# Patient Record
Sex: FEMALE | Race: WHITE | NOT HISPANIC OR LATINO | Employment: OTHER | URBAN - METROPOLITAN AREA
[De-identification: names, ages, dates, MRNs, and addresses within clinical notes are randomized per-mention and may not be internally consistent; named-entity substitution may affect disease eponyms.]

---

## 2017-09-21 DIAGNOSIS — I35.0 AORTIC VALVE STENOSIS, UNSPECIFIED ETIOLOGY: Primary | ICD-10-CM

## 2017-10-04 ENCOUNTER — HOSPITAL ENCOUNTER (OUTPATIENT)
Dept: PULMONOLOGY | Facility: CLINIC | Age: 82
Discharge: HOME | End: 2017-10-04
Payer: MEDICARE

## 2017-10-04 ENCOUNTER — OFFICE VISIT (OUTPATIENT)
Dept: CARDIOLOGY | Facility: CLINIC | Age: 82
End: 2017-10-04
Payer: MEDICARE

## 2017-10-04 ENCOUNTER — HOSPITAL ENCOUNTER (OUTPATIENT)
Dept: CARDIOLOGY | Facility: CLINIC | Age: 82
Discharge: HOME | End: 2017-10-04
Payer: MEDICARE

## 2017-10-04 ENCOUNTER — HOSPITAL ENCOUNTER (OUTPATIENT)
Dept: RADIOLOGY | Facility: HOSPITAL | Age: 82
Discharge: HOME | End: 2017-10-04
Attending: INTERNAL MEDICINE
Payer: MEDICARE

## 2017-10-04 VITALS
WEIGHT: 175.5 LBS | HEART RATE: 60 BPM | SYSTOLIC BLOOD PRESSURE: 106 MMHG | HEIGHT: 61 IN | BODY MASS INDEX: 33.14 KG/M2 | DIASTOLIC BLOOD PRESSURE: 58 MMHG | OXYGEN SATURATION: 97 %

## 2017-10-04 VITALS — BODY MASS INDEX: 34.16 KG/M2 | WEIGHT: 174 LBS | HEIGHT: 60 IN

## 2017-10-04 DIAGNOSIS — I35.0 AORTIC VALVE STENOSIS, UNSPECIFIED ETIOLOGY: ICD-10-CM

## 2017-10-04 DIAGNOSIS — I10 ESSENTIAL HYPERTENSION: ICD-10-CM

## 2017-10-04 DIAGNOSIS — I48.0 PAROXYSMAL ATRIAL FIBRILLATION: ICD-10-CM

## 2017-10-04 DIAGNOSIS — Q27.30 AVM (ARTERIOVENOUS MALFORMATION): ICD-10-CM

## 2017-10-04 DIAGNOSIS — K92.2 LOWER GI BLEED: ICD-10-CM

## 2017-10-04 DIAGNOSIS — I35.0 NODULAR CALCIFIC AORTIC VALVE STENOSIS: Primary | ICD-10-CM

## 2017-10-04 DIAGNOSIS — E03.9 HYPOTHYROIDISM, UNSPECIFIED TYPE: ICD-10-CM

## 2017-10-04 DIAGNOSIS — I35.0 AORTIC VALVE STENOSIS, ETIOLOGY OF CARDIAC VALVE DISEASE UNSPECIFIED: Primary | ICD-10-CM

## 2017-10-04 DIAGNOSIS — I35.0 NONRHEUMATIC AORTIC VALVE STENOSIS: Primary | ICD-10-CM

## 2017-10-04 DIAGNOSIS — I49.5 SSS (SICK SINUS SYNDROME): ICD-10-CM

## 2017-10-04 LAB
AORTIC VALVE REGURGITATION: ABNORMAL
AORTIC VALVE STENOSIS: ABNORMAL
DIASTOLIC DYSFUNCTION: YES
ESTIMATED PA SYSTOLIC PRESSURE: 39.72
MITRAL VALVE MOBILITY: NORMAL
MITRAL VALVE REGURGITATION: ABNORMAL
PRE FEV1 FVC: 79
PRE FEV1: 1.6
PRE FVC: 2.03
PREDICTED FEV1 FVC: 76
PREDICTED FEV1: 1.6
PREDICTED FVC: 2.2
RETIRED EF AND QEF - SEE NOTES: 68 (ref 55–65)
TRICUSPID VALVE REGURGITATION: ABNORMAL

## 2017-10-04 PROCEDURE — 93306 TTE W/DOPPLER COMPLETE: CPT | Mod: PBBFAC | Performed by: INTERNAL MEDICINE

## 2017-10-04 PROCEDURE — 74174 CTA ABD&PLVS W/CONTRAST: CPT | Mod: TC

## 2017-10-04 PROCEDURE — 71275 CT ANGIOGRAPHY CHEST: CPT | Mod: 26,GC,, | Performed by: RADIOLOGY

## 2017-10-04 PROCEDURE — 94729 DIFFUSING CAPACITY: CPT | Mod: PBBFAC | Performed by: INTERNAL MEDICINE

## 2017-10-04 PROCEDURE — 94729 DIFFUSING CAPACITY: CPT | Mod: 26,S$PBB,, | Performed by: INTERNAL MEDICINE

## 2017-10-04 PROCEDURE — 99211 OFF/OP EST MAY X REQ PHY/QHP: CPT | Mod: PBBFAC,25,27

## 2017-10-04 PROCEDURE — 94010 BREATHING CAPACITY TEST: CPT | Mod: PBBFAC | Performed by: INTERNAL MEDICINE

## 2017-10-04 PROCEDURE — 36600 WITHDRAWAL OF ARTERIAL BLOOD: CPT | Mod: PBBFAC | Performed by: INTERNAL MEDICINE

## 2017-10-04 PROCEDURE — 94620 PR PULMONARY STRESS TESTING,SIMPLE: CPT | Mod: 26,S$PBB,, | Performed by: INTERNAL MEDICINE

## 2017-10-04 PROCEDURE — 94010 BREATHING CAPACITY TEST: CPT | Mod: 26,S$PBB,, | Performed by: INTERNAL MEDICINE

## 2017-10-04 PROCEDURE — 82803 BLOOD GASES ANY COMBINATION: CPT | Mod: PBBFAC | Performed by: INTERNAL MEDICINE

## 2017-10-04 PROCEDURE — 36600 WITHDRAWAL OF ARTERIAL BLOOD: CPT | Mod: S$PBB,,, | Performed by: INTERNAL MEDICINE

## 2017-10-04 PROCEDURE — 99999 PR PBB SHADOW E&M-EST. PATIENT-LVL I: CPT | Mod: PBBFAC,,,

## 2017-10-04 PROCEDURE — 74174 CTA ABD&PLVS W/CONTRAST: CPT | Mod: 26,GC,, | Performed by: RADIOLOGY

## 2017-10-04 PROCEDURE — 99214 OFFICE O/P EST MOD 30 MIN: CPT | Mod: S$PBB,,, | Performed by: INTERNAL MEDICINE

## 2017-10-04 PROCEDURE — 99215 OFFICE O/P EST HI 40 MIN: CPT | Mod: PBBFAC,25

## 2017-10-04 PROCEDURE — 99205 OFFICE O/P NEW HI 60 MIN: CPT | Mod: S$PBB,,, | Performed by: THORACIC SURGERY (CARDIOTHORACIC VASCULAR SURGERY)

## 2017-10-04 PROCEDURE — 94620 PR PULMONARY STRESS TESTING,SIMPLE: CPT | Mod: PBBFAC | Performed by: INTERNAL MEDICINE

## 2017-10-04 PROCEDURE — 99999 PR PBB SHADOW E&M-EST. PATIENT-LVL V: CPT | Mod: PBBFAC,,,

## 2017-10-04 PROCEDURE — 25500020 PHARM REV CODE 255: Performed by: INTERNAL MEDICINE

## 2017-10-04 RX ORDER — KETOCONAZOLE 20 MG/ML
SHAMPOO, SUSPENSION TOPICAL
Refills: 4 | COMMUNITY
Start: 2017-09-07 | End: 2019-03-23

## 2017-10-04 RX ORDER — DIPHENHYDRAMINE HCL 25 MG
50 CAPSULE ORAL ONCE
Status: CANCELLED | OUTPATIENT
Start: 2017-10-04 | End: 2017-10-04

## 2017-10-04 RX ORDER — ACETAMINOPHEN 325 MG/1
325 TABLET ORAL EVERY 6 HOURS PRN
COMMUNITY
End: 2019-03-23

## 2017-10-04 RX ORDER — DEXTROSE MONOHYDRATE AND SODIUM CHLORIDE 5; .45 G/100ML; G/100ML
INJECTION, SOLUTION INTRAVENOUS CONTINUOUS
Status: CANCELLED | OUTPATIENT
Start: 2017-10-04

## 2017-10-04 RX ADMIN — IOHEXOL 100 ML: 350 INJECTION, SOLUTION INTRAVENOUS at 10:10

## 2017-10-04 NOTE — PROGRESS NOTES
"INTERVENTIONAL CARDIOLOGY  VALVE CENTER    Referring Doctor: Dr. Heller    Reason for Consult: Severe Aortic Stenosis    HISTORY OF PRESENT ILLNESS:    Sangeetha Morales is a 89 y.o. female referred by Dr. Heller for evaluation of severe AS (NYHA Class III sx). She has a PMH of HTN, HLD, AF and PPM 1/2016. The patient was told a few months ago about her valve. The patient was on Eliquis (stopped 2/2 GIB in 2016). She reports for the GIB, they "cauterized my stomach" and is s/p colectomy. The patient reports SOB with talking worsening since Jan 2017. She denies PND and orthopnea. She reports LE swelling since 1/2017 and denies LOC and CP. The patient denies bloody stools currently. She uses a walker and gets SOB when using a walker [id number]. The patient cooks for herself. She has not been surgery yet.   she has undergone the following TAVR work-up:   ECHO (Date 10/4/17): JAZZMINE= 0.67 cm2, MG= 58 mmHg, [address]= 5.0 m/s, EF= 65-70%.   LHC (Date 9/6/17): LM calcified (normal), LAD non-obstructive (mLAD 25% just before large diagonal branch), pLCx 25% stenosis, RCA non-obstructive (right dominant)  STS: 5.7%   Frailty: 2/4   Iliacs are >7.94 mm on L and > 8.02 mm on R   LVOT area by CTA is 4.97 cm2 and Avg Diameter is 25.2 mm (distance 28.6 mm x 21.3 mm) per Dr Cleary  Other CT findings: 1.0 cm sub-solid pulmonary nodule in the left upper lobe. Finding is nonspecific and could represent infection, inflammation, with neoplasm a possibility.  Recommend followup in 6 months (4/2018). Will defer to PCP. Indeterminate cystic structure the right adnexa.  Recommend dedicated pelvic ultrasound for further assessment.  Full CODE STATUS  EKG findings: 12/2016: 1st degree HB; bifascicular block  PFTs: Mild: FEV1 100% predicted, FVC 94% predicted, MVV 82% predicted, DLCO 124% predicted  6MWT: 636 ft  High risk: age & frailty per Dr. Head on 10/4/17    She is a 29 mm EvolutR candidate via R TF access.      Past Medical History: "   Diagnosis Date    Afib     Anemia 10/2016    Anticoagulant long-term use     eliquis    Arthritis     Cardiomegaly     Colon polyps 10/2016    Hyperlipidemia     Hypertension        Past Surgical History:   Procedure Laterality Date    CARDIOVERSION  12/2014    CARPAL TUNNEL RELEASE Bilateral     CATARACT EXTRACTION Bilateral     HYSTERECTOMY      bso    JOINT REPLACEMENT      RIGHT COLECTOMY  10/06/2016    TOTAL KNEE ARTHROPLASTY Right        Review of Systems   Constitutional: Negative for chills and fever.   HENT: Negative for sore throat.    Eyes: Negative for photophobia and pain.   Respiratory: Positive for shortness of breath. Negative for cough and stridor.    Cardiovascular: Positive for leg swelling. Negative for chest pain, orthopnea and PND.   Gastrointestinal: Negative for diarrhea and vomiting.   Genitourinary: Negative for dysuria and hematuria.   Musculoskeletal: Negative for falls and myalgias.   Skin: Negative for itching and rash.   Neurological: Negative for focal weakness and loss of consciousness.   Psychiatric/Behavioral: The patient is nervous/anxious. The patient does not have insomnia.        LMP  (LMP Unknown)     Physical Exam   Constitutional: She is oriented to person, place, and time and well-developed, well-nourished, and in no distress.   HENT:   Head: Normocephalic and atraumatic.   Eyes: EOM are normal. Pupils are equal, round, and reactive to light.   Neck: Normal range of motion. Neck supple.   Cardiovascular: Normal rate and regular rhythm.  Exam reveals no gallop and no friction rub.    Murmur heard.  3/6 systolic murmur heard loudest in the second intercostal space right; also hear second intercostal space left   Pulmonary/Chest: Effort normal. She has no wheezes. She has no rales.   Abdominal: Soft. She exhibits no distension. There is no tenderness. There is no rebound and no guarding.   Musculoskeletal: She exhibits edema.   1+ edema   Neurological: She is  alert and oriented to person, place, and time.   Skin: Skin is warm.   Psychiatric: Affect normal.       CMP  Sodium   Date Value Ref Range Status   10/04/2017 142 136 - 145 mmol/L Final     Potassium   Date Value Ref Range Status   10/04/2017 3.8 3.5 - 5.1 mmol/L Final     Chloride   Date Value Ref Range Status   10/04/2017 105 95 - 110 mmol/L Final     CO2   Date Value Ref Range Status   10/04/2017 27 23 - 29 mmol/L Final     Glucose   Date Value Ref Range Status   10/04/2017 103 70 - 110 mg/dL Final     BUN, Bld   Date Value Ref Range Status   10/04/2017 15 8 - 23 mg/dL Final     Creatinine   Date Value Ref Range Status   10/04/2017 0.9 0.5 - 1.4 mg/dL Final     Calcium   Date Value Ref Range Status   10/04/2017 9.5 8.7 - 10.5 mg/dL Final     Total Protein   Date Value Ref Range Status   11/26/2016 7.0 6.0 - 8.4 g/dL Final     Albumin   Date Value Ref Range Status   10/04/2017 3.8 3.5 - 5.2 g/dL Final     Total Bilirubin   Date Value Ref Range Status   11/26/2016 0.7 0.2 - 1.3 mg/dL Final     Comment:     For infants and newborns, interpretation of results should be based  on gestational age, weight and in agreement with clinical  observations.  Premature Infant recommended reference ranges:  Up to 24 hours.............<8.0 mg/dL  Up to 48 hours............<12.0 mg/dL  3-5 days..................<15.0 mg/dL  6-29 days.................<15.0 mg/dL       Alkaline Phosphatase   Date Value Ref Range Status   11/26/2016 55 38 - 145 U/L Final     AST   Date Value Ref Range Status   11/26/2016 32 14 - 36 U/L Final     ALT   Date Value Ref Range Status   11/26/2016 28 10 - 44 U/L Final     Anion Gap   Date Value Ref Range Status   10/04/2017 10 8 - 16 mmol/L Final     eGFR if    Date Value Ref Range Status   10/04/2017 >60.0 >60 mL/min/1.73 m^2 Final     eGFR if non    Date Value Ref Range Status   10/04/2017 56.9 (A) >60 mL/min/1.73 m^2 Final     Comment:     Calculation used to obtain the  estimated glomerular filtration  rate (eGFR) is the CKD-EPI equation. Since race is unknown   in our information system, the eGFR values for   -American and Non--American patients are given   for each creatinine result.       Lab Results   Component Value Date    WBC 8.68 10/04/2017    HGB 14.9 10/04/2017    HCT 44.9 10/04/2017    MCV 93 10/04/2017     10/04/2017     Lab Results   Component Value Date    APTT 27.0 11/30/2016     Lab Results   Component Value Date    INR 1.0 11/30/2016    INR 1.2 11/26/2016    INR 1.4 10/20/2016     CT 10/4/2017  1.0 cm sub-solid pulmonary nodule in the left upper lobe. Finding is nonspecific and could represent infection, inflammation, with neoplasm a possibility.  Recommend followup in 6 months (4/2018).    Indeterminate cystic structure the right adnexa.  Recommend dedicated pelvic ultrasound for further assessment.    TAVR measurements as detailed above.    Moderate aortic valve calcification.    Small hiatal hernia.    Postsurgical changes of right hemicolectomy with ileocolic anastomosis.    This report has been flagged in the [facility] medical record.    ASSESSMENT:    Patient Active Problem List   Diagnosis    Aortic stenosis: she has undergone the following TAVR work-up:   ECHO (Date 10/4/17): JAZZMINE= 0.67 cm2, MG= 58 mmHg, [address]= 5.0 m/s, EF= 65-70%.   LHC (Date 9/6/17): LM calcified (normal), LAD non-obstructive (mLAD 25% just before large diagonal branch), pLCx 25% stenosis, RCA non-obstructive (right dominant)  STS: 5.7%   Frailty: 2/4   Iliacs are >7.94 mm on L and > 8.02 mm on R   LVOT area by CTA is 4.97 cm2 and Avg Diameter is 25.2 mm (distance 28.6 mm x 21.3 mm) per Dr Cleary  Other CT findings: 1.0 cm sub-solid pulmonary nodule in the left upper lobe. Finding is nonspecific and could represent infection, inflammation, with neoplasm a possibility.  Recommend followup in 6 months (4/2018). Will defer to PCP. Indeterminate cystic structure the right  adnexa.  Recommend dedicated pelvic ultrasound for further assessment.  Full CODE STATUS  EKG findings: 12/2016: 1st degree HB; bifascicular block  PFTs: Mild: FEV1 100% predicted, FVC 94% predicted, MVV 82% predicted, DLCO 124% predicted  6MWT: 636 ft  High risk: age & frailty per Dr. Head on 10/4/17    He is a 29 mm EvolutR candidate via R TF access.    Colon polyps    Paroxysmal atrial fibrillation: on ASA w/ h/o GIB on Eliquis; on amiodarone    Lower GI bleed: no longer on Eliquis; s/p partial colectomy    Symptomatic anemia: 14.9/44.9    Essential hypertension: controlled on amlodipine, enalapril            SSS (sick sinus syndrome): s/p PPM    AVM (arteriovenous malformation)    Hypothyroidism: on Synthroid     PLAN:    The patient is candidate for 29 mm Evolute via R TF access for Nov 28, 2017. We will have PCP perform pelvic US and f/u CT in 6 months based on incidental CT findings (see above). The patient will be on ASA alone after PERRY based on GIB. Will bring back to TAVR clinic to sign consents.     Will forward final CT report to Dr Jeromy James to schedule further ultrasound given incidental finding of adnexal cyst.      Everardo Cleary MD  Interventional Cardiology  Structural/Valvular heart disease  [phone]

## 2017-10-05 NOTE — PROCEDURES
Sangeetha Morales is a 89 y.o.  female patient, who presents for a 6 minute walk test ordered by Carroll Morales MD.  The diagnosis is Aortic Valve Disorder.  The patient's BMI is 34.1 kg/m2.  Predicted distance (lower limit of normal) is 146.35 meters.      Test Results:    The test was completed without stopping.  The total time walked was 360 seconds.  During walking, the patient reported:  Dyspnea, Lightheadedness.  The patient used a walker for assistance during testing.     10/04/2017---------Distance: 193.85 meters (636 feet)     O2 Sat % Supplemental Oxygen Heart Rate Blood Pressure Kimberlee Scale   Pre-exercise  (Resting) 97 % Room Air 60 bpm 163/78 mmHg 1   During Exercise 96 % Room Air 110 bpm 185/88 mmHg 2   Post-exercise  (Recovery) 97 % Room Air  74 bpm       Recovery Time:  88 seconds    Performing nurse/tech:  ERICA Lieberman      PREVIOUS STUDY:   The patient has not had a previous study.      CLINICAL INTERPRETATION:  Six minute walk distance is 193.85 meters (636 feet) with light dyspnea.  During exercise, there was no significant desaturation while breathing room air.  Both blood pressure and heart rate increased significantly with walking.  Hypertension was present prior to exercise.  The patient reported non-pulmonary symptoms during exercise.  Significant exercise impairment is likely due to cardiovascular causes and subjective symptoms.  No previous study performed.  Based upon age and body mass index, exercise capacity may be normal.

## 2017-10-06 NOTE — PROGRESS NOTES
"History & Physical    SUBJECTIVE:     History of Present Illness:  Patient is a 89 y.o. female referred by Dr. Heller,  for evaluation of severe aortic stenosis (NYHA Class III sx). She has a past history of HTN, HLD, AF and PPM 1/2016.  She was on Eliquis (stopped 2/2 GIB in 2016). She reports for the GIB, they "cauterized my stomach" and is s/p colectomy. The patient reports SOB that has been progressive since January. She denies PND and orthopnea. She reports LE swelling since 1/2017 and denies LOC and CP. The patient denies bloody stools currently. She uses a walker and gets SOB when using a walker [id number].     she has undergone the following TAVR work-up:   · ECHO (Date 10/4/17): JAZZMINE= 0.67 cm2, MG= 58 mmHg, [address]= 5.0 m/s, EF= 65-70%.   · LHC (Date 9/6/17): LM calcified (normal), LAD non-obstructive (mLAD 25% just before large diagonal branch), pLCx 25% stenosis, RCA non-obstructive (right dominant)  · STS: 5.7%   · Frailty: 2/4   · Iliacs are >7.94 mm on L and > 8.02 mm on R   · LVOT area by CTA is 4.97 cm2 and Avg Diameter is 25.2 mm (distance 28.6 mm x 21.3 mm) per Dr Cleary  · Other CT findings: 1.0 cm sub-solid pulmonary nodule in the left upper lobe. Finding is nonspecific and could represent infection, inflammation, with neoplasm a possibility.  Recommend followup in 6 months (4/2018). Will defer to PCP. Indeterminate cystic structure the right adnexa.  Recommend dedicated pelvic ultrasound for further assessment.  · Full CODE STATUS  · EKG findings: 12/2016: 1st degree HB; bifascicular block  · PFTs: Mild: FEV1 100% predicted, FVC 94% predicted, MVV 82% predicted, DLCO 124% predicted  · 6MWT: 636 ft    No chief complaint on file.      Review of patient's allergies indicates:  No Known Allergies    Current Outpatient Prescriptions   Medication Sig Dispense Refill    acetaminophen (TYLENOL) 325 MG tablet Take 325 mg by mouth every 6 (six) hours as needed for Pain.      amiodarone (PACERONE) 200 " MG Tab Take 1 mg by mouth every evening.       amlodipine (NORVASC) 10 MG tablet Take 10 mg by mouth once daily.      aspirin (ECOTRIN) 81 MG EC tablet Take 1 tablet (81 mg total) by mouth once daily.  0    atorvastatin (LIPITOR) 20 MG tablet Take 20 mg by mouth every evening.       enalapril (VASOTEC) 20 MG tablet Take 20 mg by mouth every evening.       ferrous gluconate (FERGON) 324 MG tablet Take 1 tablet (324 mg total) by mouth 3 (three) times daily with meals.      fluoxetine (PROZAC) 20 MG capsule Take 20 mg by mouth every evening.   3    folic acid (FOLVITE) 1 MG tablet Take 1 mg by mouth once daily.      furosemide (LASIX) 20 MG tablet Take 20 mg by mouth twice a week. Wed and sat      gabapentin (NEURONTIN) 400 MG capsule Take 400 mg by mouth 2 (two) times daily.      ketoconazole (NIZORAL) 2 % shampoo APPLY to scalp 2-3 times per week  4    levothyroxine (SYNTHROID) 25 MCG tablet Take 1 tablet (25 mcg total) by mouth before breakfast. 30 tablet 0    pantoprazole (PROTONIX) 40 MG tablet Take 1 tablet (40 mg total) by mouth once daily. 30 tablet 0    polyethylene glycol (GLYCOLAX) 17 gram PwPk Take 17 g by mouth once daily.  0    sucralfate (CARAFATE) 1 gram tablet 4 (four) times daily before meals and nightly.   6     No current facility-administered medications for this visit.        Past Medical History:   Diagnosis Date    Afib     Anemia 10/2016    Anticoagulant long-term use     eliquis    Arthritis     Cardiomegaly     Colon polyps 10/2016    Hyperlipidemia     Hypertension      Past Surgical History:   Procedure Laterality Date    CARDIOVERSION  12/2014    CARPAL TUNNEL RELEASE Bilateral     CATARACT EXTRACTION Bilateral     HYSTERECTOMY      bso    JOINT REPLACEMENT      RIGHT COLECTOMY  10/06/2016    TOTAL KNEE ARTHROPLASTY Right      Family History   Problem Relation Age of Onset    Early death Mother      pneumonia    Hypertension Father     Cancer Father       face    Cancer Brother      colon    Heart disease Brother      Social History   Substance Use Topics    Smoking status: Never Smoker    Smokeless tobacco: Never Used    Alcohol use No     Review of Systems   Review of Systems   Constitutional: Negative for fever and malaise/fatigue.   HENT: Negative for congestion and sore throat.    Eyes: Negative for blurred vision, double vision and discharge.   Respiratory: see HPI   Cardiovascular: see HPI  Gastrointestinal: Negative for abdominal pain, constipation, diarrhea, nausea and vomiting.   Genitourinary: Negative for dysuria, frequency and urgency.   Musculoskeletal: Negative for back pain and myalgias.   Skin: Negative for itching and rash.   Neurological: Negative for dizziness, speech change, seizures, weakness and headaches.   Endo/Heme/Allergies: Does not bruise/bleed easily.   Psychiatric/Behavioral: Negative for depression. The patient is not nervous/anxious.        OBJECTIVE:     Vital Signs (Most Recent)  reviewed      Physical Exam     Physical Exam   Constitutional: Patient appears well-developed and well-nourished.   HENT:   Head: Normocephalic and atraumatic.   Eyes: Pupils are equal, round, and reactive to light.   Neck: Normal range of motion. Neck supple.   Cardiovascular: Normal rate, regular rhythm and normal heart sounds.    Pulmonary/Chest: Effort normal and breath sounds normal.   Abdominal: Soft. Bowel sounds are normal.   Musculoskeletal: Normal range of motion.   Neurological: Alert and oriented to person, place, and time.   Skin: Skin is warm and dry.   Psychiatric: Normal mood and affect. Behavior is normal.         ASSESSMENT/PLAN:   89 year old female with severe AS presents for TAVR evaluation.    PLAN:     High risk for SAVR due to age and debility

## 2017-11-22 DIAGNOSIS — R94.5 ABNORMAL RESULTS OF LIVER FUNCTION STUDIES: ICD-10-CM

## 2017-11-22 DIAGNOSIS — I25.10 CORONARY ARTERY DISEASE, ANGINA PRESENCE UNSPECIFIED, UNSPECIFIED VESSEL OR LESION TYPE, UNSPECIFIED WHETHER NATIVE OR TRANSPLANTED HEART: Primary | ICD-10-CM

## 2017-11-22 DIAGNOSIS — D64.9 ANEMIA, UNSPECIFIED TYPE: ICD-10-CM

## 2017-11-22 DIAGNOSIS — R79.1 ABNORMAL COAGULATION PROFILE: ICD-10-CM

## 2017-11-26 ENCOUNTER — DOCUMENTATION ONLY (OUTPATIENT)
Dept: CARDIOTHORACIC SURGERY | Facility: HOSPITAL | Age: 82
End: 2017-11-26

## 2017-11-27 ENCOUNTER — OFFICE VISIT (OUTPATIENT)
Dept: CARDIOLOGY | Facility: CLINIC | Age: 82
DRG: 266 | End: 2017-11-27
Payer: MEDICARE

## 2017-11-27 VITALS
WEIGHT: 173.94 LBS | HEART RATE: 55 BPM | DIASTOLIC BLOOD PRESSURE: 67 MMHG | HEIGHT: 62 IN | SYSTOLIC BLOOD PRESSURE: 128 MMHG | BODY MASS INDEX: 32.01 KG/M2 | OXYGEN SATURATION: 96 %

## 2017-11-27 DIAGNOSIS — I35.0 AORTIC VALVE STENOSIS, ETIOLOGY OF CARDIAC VALVE DISEASE UNSPECIFIED: Primary | ICD-10-CM

## 2017-11-27 DIAGNOSIS — I48.0 PAROXYSMAL ATRIAL FIBRILLATION: ICD-10-CM

## 2017-11-27 DIAGNOSIS — I10 ESSENTIAL HYPERTENSION: ICD-10-CM

## 2017-11-27 DIAGNOSIS — Z95.0 HX OF CARDIAC PACEMAKER: Chronic | ICD-10-CM

## 2017-11-27 DIAGNOSIS — Q27.30 AVM (ARTERIOVENOUS MALFORMATION): ICD-10-CM

## 2017-11-27 DIAGNOSIS — K92.2 LOWER GI BLEED: ICD-10-CM

## 2017-11-27 DIAGNOSIS — E03.9 HYPOTHYROIDISM, UNSPECIFIED TYPE: ICD-10-CM

## 2017-11-27 DIAGNOSIS — I49.5 SSS (SICK SINUS SYNDROME): ICD-10-CM

## 2017-11-27 DIAGNOSIS — I48.91 ATRIAL FIBRILLATION, UNSPECIFIED TYPE: ICD-10-CM

## 2017-11-27 PROCEDURE — 99214 OFFICE O/P EST MOD 30 MIN: CPT | Mod: S$PBB,,, | Performed by: INTERNAL MEDICINE

## 2017-11-27 PROCEDURE — 99213 OFFICE O/P EST LOW 20 MIN: CPT | Mod: PBBFAC

## 2017-11-27 PROCEDURE — 99999 PR PBB SHADOW E&M-EST. PATIENT-LVL III: CPT | Mod: PBBFAC,,,

## 2017-11-27 NOTE — PROGRESS NOTES
"History & Physical     SUBJECTIVE:      History of Present Illness:  Patient is a 89 y.o. female referred by Dr. Heller,  for evaluation of severe aortic stenosis (NYHA Class III sx). She has a past history of HTN, HLD, AF and PPM 1/2016.  She was on Eliquis (stopped 2/2 GIB in 2016). She reports for the GIB, they "cauterized my stomach" and is s/p colectomy. The patient reports SOB that has been progressive since January. She denies PND and orthopnea. She reports LE swelling since 1/2017 and denies LOC and CP. The patient denies bloody stools currently. She uses a walker and gets SOB when using a walker [id number].      she has undergone the following TAVR work-up:   · ECHO (Date 10/4/17): JAZZMINE= 0.67 cm2, MG= 58 mmHg, [address]= 5.0 m/s, EF= 65-70%.   · LHC (Date 9/6/17): LM calcified (normal), LAD non-obstructive (mLAD 25% just before large diagonal branch), pLCx 25% stenosis, RCA non-obstructive (right dominant)  · STS: 5.7%   · Frailty: 2/4   · Iliacs are >7.94 mm on L and > 8.02 mm on R   · LVOT area by CTA is 4.97 cm2 and Avg Diameter is 25.2 mm (distance 28.6 mm x 21.3 mm) per Dr Cleary  · Other CT findings: 1.0 cm sub-solid pulmonary nodule in the left upper lobe. Finding is nonspecific and could represent infection, inflammation, with neoplasm a possibility.  Recommend followup in 6 months (4/2018). Will defer to PCP. Indeterminate cystic structure the right adnexa.  Recommend dedicated pelvic ultrasound for further assessment.  · Full CODE STATUS  · EKG findings: 12/2016: 1st degree HB; bifascicular block  · PFTs: Mild: FEV1 100% predicted, FVC 94% predicted, MVV 82% predicted, DLCO 124% predicted  · 6MWT: 636 ft     No chief complaint on file.        Review of patient's allergies indicates:  No Known Allergies     Current Medications          Current Outpatient Prescriptions   Medication Sig Dispense Refill    acetaminophen (TYLENOL) 325 MG tablet Take 325 mg by mouth every 6 (six) hours as needed for " Pain.        amiodarone (PACERONE) 200 MG Tab Take 1 mg by mouth every evening.         amlodipine (NORVASC) 10 MG tablet Take 10 mg by mouth once daily.        aspirin (ECOTRIN) 81 MG EC tablet Take 1 tablet (81 mg total) by mouth once daily.   0    atorvastatin (LIPITOR) 20 MG tablet Take 20 mg by mouth every evening.         enalapril (VASOTEC) 20 MG tablet Take 20 mg by mouth every evening.         ferrous gluconate (FERGON) 324 MG tablet Take 1 tablet (324 mg total) by mouth 3 (three) times daily with meals.        fluoxetine (PROZAC) 20 MG capsule Take 20 mg by mouth every evening.    3    folic acid (FOLVITE) 1 MG tablet Take 1 mg by mouth once daily.        furosemide (LASIX) 20 MG tablet Take 20 mg by mouth twice a week. Wed and sat        gabapentin (NEURONTIN) 400 MG capsule Take 400 mg by mouth 2 (two) times daily.        ketoconazole (NIZORAL) 2 % shampoo APPLY to scalp 2-3 times per week   4    levothyroxine (SYNTHROID) 25 MCG tablet Take 1 tablet (25 mcg total) by mouth before breakfast. 30 tablet 0    pantoprazole (PROTONIX) 40 MG tablet Take 1 tablet (40 mg total) by mouth once daily. 30 tablet 0    polyethylene glycol (GLYCOLAX) 17 gram PwPk Take 17 g by mouth once daily.   0    sucralfate (CARAFATE) 1 gram tablet 4 (four) times daily before meals and nightly.    6      No current facility-administered medications for this visit.                  Past Medical History:   Diagnosis Date    Afib      Anemia 10/2016    Anticoagulant long-term use       eliquis    Arthritis      Cardiomegaly      Colon polyps 10/2016    Hyperlipidemia      Hypertension              Past Surgical History:   Procedure Laterality Date    CARDIOVERSION   12/2014    CARPAL TUNNEL RELEASE Bilateral      CATARACT EXTRACTION Bilateral      HYSTERECTOMY         bso    JOINT REPLACEMENT        RIGHT COLECTOMY   10/06/2016    TOTAL KNEE ARTHROPLASTY Right               Family History   Problem  Relation Age of Onset    Early death Mother         pneumonia    Hypertension Father      Cancer Father         face    Cancer Brother         colon    Heart disease Brother             Social History   Substance Use Topics    Smoking status: Never Smoker    Smokeless tobacco: Never Used    Alcohol use No      Review of Systems   Review of Systems   Constitutional: Negative for fever and malaise/fatigue.   HENT: Negative for congestion and sore throat.    Eyes: Negative for blurred vision, double vision and discharge.   Respiratory: see HPI   Cardiovascular: see HPI  Gastrointestinal: Negative for abdominal pain, constipation, diarrhea, nausea and vomiting.   Genitourinary: Negative for dysuria, frequency and urgency.   Musculoskeletal: Negative for back pain and myalgias.   Skin: Negative for itching and rash.   Neurological: Negative for dizziness, speech change, seizures, weakness and headaches.   Endo/Heme/Allergies: Does not bruise/bleed easily.   Psychiatric/Behavioral: Negative for depression. The patient is not nervous/anxious.          OBJECTIVE:      Vital Signs (Most Recent)  reviewed        Physical Exam      Physical Exam   Constitutional: Patient appears well-developed and well-nourished.   HENT:   Head: Normocephalic and atraumatic.   Eyes: Pupils are equal, round, and reactive to light.   Neck: Normal range of motion. Neck supple.   Cardiovascular: Normal rate, regular rhythm and normal heart sounds.    Pulmonary/Chest: Effort normal and breath sounds normal.   Abdominal: Soft. Bowel sounds are normal.   Musculoskeletal: Normal range of motion.   Neurological: Alert and oriented to person, place, and time.   Skin: Skin is warm and dry.   Psychiatric: Normal mood and affect. Behavior is normal.            ASSESSMENT/PLAN:   89 year old female with severe AS presents for TAVR evaluation.     PLAN: Pt is High risk for SAVR due to age and debility

## 2017-11-27 NOTE — PROGRESS NOTES
"INTERVENTIONAL CARDIOLOGY  [facility]    Referring: Dr. Heller    Reason for Consult: Severe Aortic Stenosis    HISTORY OF PRESENT ILLNESS:    Sangeetha Morales is a 89 y.o. female referred by Dr. Heller for evaluation of severe AS (NYHA Class III sx). She has a PMH of HTN, HLD, AF and SSS s/p PPM 11/2016. She was told she had Aortic Stenosis this year. 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 During work-up "they cauterized my stomach" and is also s/p colectomy due to polyps. The patient reports SOB with both exertion and with talking worsening since Jan 2017. She denies PND and orthopnea. She reports LE swelling since 1/2017 for which she takes Lasix daily, increased from twice weekly. She denies chest pain, syncope, palpitations. She uses a walker and gets SOB when walking [id number]. She continues to cook and perform ADLs for herself. She ambulates ~20 minutes each day in preparation for TAVR.     she has undergone the following TAVR work-up:   ECHO (Date 10/4/17): JAZZMINE= 0.67 cm2, MG= 58 mmHg, [address]= 5.0 m/s, EF= 65-70%.   LHC (Date 9/6/17): LM calcified (normal), LAD non-obstructive (mLAD 25% just before large diagonal branch), pLCx 25% stenosis, RCA non-obstructive (right dominant)  STS: 5.7%   Frailty: 2/4   Iliacs are >7.94 mm on L and > 8.02 mm on R   LVOT area by CTA is 4.97 cm2 and Avg Diameter is 25.2 mm (distance 28.6 mm x 21.3 mm) per Dr Cleary  Other CT findings: 1.0 cm sub-solid pulmonary nodule in the left upper lobe. Finding is nonspecific and could represent infection, inflammation, with neoplasm a possibility. PCP, Dr. Jeromy James, coordinated pelvic ultrasound and will schedule 6 mo f/u CT for further assessment of pulm nodule.  Full CODE STATUS  EKG findings: 12/2016: 1st degree HB; bifascicular block- PPM in situ  PFTs: Mild: FEV1 100% predicted, FVC 94% predicted, MVV 82% predicted, DLCO 124% predicted  6MWT: 636 ft  High risk: age & frailty per Dr. Head " and Dr. Ramírez on 10/4/17.    She is a 29 mm EvolutR candidate via R TF access.      Past Medical History:   Diagnosis Date    Afib     Anemia 10/2016    Anticoagulant long-term use     eliquis    Arthritis     Cardiomegaly     Colon polyps 10/2016    Hyperlipidemia     Hypertension        Past Surgical History:   Procedure Laterality Date    CARDIOVERSION  12/2014    CARPAL TUNNEL RELEASE Bilateral     CATARACT EXTRACTION Bilateral     HYSTERECTOMY      bso    JOINT REPLACEMENT      RIGHT COLECTOMY  10/06/2016    TOTAL KNEE ARTHROPLASTY Right        Review of Systems   Constitutional: Negative for chills and fever.   HENT: Negative for sore throat.    Eyes: Negative for photophobia and pain.   Respiratory: Positive for shortness of breath. Negative for cough and stridor.    Cardiovascular: Positive for leg swelling. Negative for chest pain, orthopnea and PND.   Gastrointestinal: Negative for diarrhea and vomiting.   Genitourinary: Negative for dysuria and hematuria.   Musculoskeletal: Negative for falls and myalgias.   Skin: Negative for itching and rash.   Neurological: Negative for focal weakness and loss of consciousness.   Psychiatric/Behavioral: The patient is nervous/anxious. The patient does not have insomnia.      Physical Exam   Constitutional: She is oriented to person, place, and time and well-developed, well-nourished, and in no distress.   HENT:   Head: Normocephalic and atraumatic.   Eyes: EOM are normal. Pupils are equal, round, and reactive to light.   Neck: Normal range of motion. Neck supple.   Cardiovascular: Normal rate and regular rhythm.  Exam reveals no gallop and no friction rub.    Murmur heard.  3/6 systolic murmur heard loudest in the second intercostal space right; also hear second intercostal space left   Pulmonary/Chest: Effort normal. She has no wheezes. She has no rales.   Abdominal: Soft. She exhibits no distension. There is no tenderness. There is no rebound and no  guarding.   Musculoskeletal: She exhibits edema.   1+ edema   Neurological: She is alert and oriented to person, place, and time.   Skin: Skin is warm.   Psychiatric: Affect normal.       CMP  Sodium   Date Value Ref Range Status   10/04/2017 142 136 - 145 mmol/L Final     Potassium   Date Value Ref Range Status   10/04/2017 3.8 3.5 - 5.1 mmol/L Final     Chloride   Date Value Ref Range Status   10/04/2017 105 95 - 110 mmol/L Final     CO2   Date Value Ref Range Status   10/04/2017 27 23 - 29 mmol/L Final     Glucose   Date Value Ref Range Status   10/04/2017 103 70 - 110 mg/dL Final     BUN, Bld   Date Value Ref Range Status   10/04/2017 15 8 - 23 mg/dL Final     Creatinine   Date Value Ref Range Status   10/04/2017 0.9 0.5 - 1.4 mg/dL Final     Calcium   Date Value Ref Range Status   10/04/2017 9.5 8.7 - 10.5 mg/dL Final     Total Protein   Date Value Ref Range Status   11/26/2016 7.0 6.0 - 8.4 g/dL Final     Albumin   Date Value Ref Range Status   10/04/2017 3.8 3.5 - 5.2 g/dL Final     Total Bilirubin   Date Value Ref Range Status   11/26/2016 0.7 0.2 - 1.3 mg/dL Final     Comment:     For infants and newborns, interpretation of results should be based  on gestational age, weight and in agreement with clinical  observations.  Premature Infant recommended reference ranges:  Up to 24 hours.............<8.0 mg/dL  Up to 48 hours............<12.0 mg/dL  3-5 days..................<15.0 mg/dL  6-29 days.................<15.0 mg/dL       Alkaline Phosphatase   Date Value Ref Range Status   11/26/2016 55 38 - 145 U/L Final     AST   Date Value Ref Range Status   11/26/2016 32 14 - 36 U/L Final     ALT   Date Value Ref Range Status   11/26/2016 28 10 - 44 U/L Final     Anion Gap   Date Value Ref Range Status   10/04/2017 10 8 - 16 mmol/L Final     eGFR if    Date Value Ref Range Status   10/04/2017 >60.0 >60 mL/min/1.73 m^2 Final     eGFR if non    Date Value Ref Range Status   10/04/2017  56.9 (A) >60 mL/min/1.73 m^2 Final     Comment:     Calculation used to obtain the estimated glomerular filtration  rate (eGFR) is the CKD-EPI equation. Since race is unknown   in our information system, the eGFR values for   -American and Non--American patients are given   for each creatinine result.       Lab Results   Component Value Date    WBC 9.89 11/27/2017    HGB 13.6 11/27/2017    HCT 41.1 11/27/2017    MCV 93 11/27/2017     11/27/2017     Lab Results   Component Value Date    APTT 27.0 11/30/2016     Lab Results   Component Value Date    INR 1.0 11/30/2016    INR 1.2 11/26/2016    INR 1.4 10/20/2016     CT 10/4/2017  1.0 cm sub-solid pulmonary nodule in the left upper lobe. Finding is nonspecific and could represent infection, inflammation, with neoplasm a possibility.  Recommend followup in 6 months (4/2018).    Indeterminate cystic structure the right adnexa.  Recommend dedicated pelvic ultrasound for further assessment.    TAVR measurements as detailed above.    Moderate aortic valve calcification.    Small hiatal hernia.    Postsurgical changes of right hemicolectomy with ileocolic anastomosis.    This report has been flagged in the [facility] medical record.    ASSESSMENT:    Patient Active Problem List   Diagnosis    Aortic stenosis: she has undergone the following TAVR work-up:   ECHO (Date 10/4/17): JAZZMINE= 0.67 cm2, MG= 58 mmHg, [address]= 5.0 m/s, EF= 65-70%.   LHC (Date 9/6/17): LM calcified (normal), LAD non-obstructive (mLAD 25% just before large diagonal branch), pLCx 25% stenosis, RCA non-obstructive (right dominant)  STS: 5.7%   Frailty: 2/4   Iliacs are >7.94 mm on L and > 8.02 mm on R   LVOT area by CTA is 4.97 cm2 and Avg Diameter is 25.2 mm (distance 28.6 mm x 21.3 mm) per Dr Cleary  Other CT findings: 1.0 cm sub-solid pulmonary nodule in the left upper lobe. Finding is nonspecific and could represent infection, inflammation, with neoplasm a possibility. PCP, Dr. Pinzon  Jacob, coordinated pelvic ultrasound and will schedule 6 mo f/u CT for further assessment of pulm nodule.  Full CODE STATUS  EKG findings: 12/2016: 1st degree HB; bifascicular block- PPM in situ  PFTs: Mild: FEV1 100% predicted, FVC 94% predicted, MVV 82% predicted, DLCO 124% predicted  6MWT: 636 ft  High risk: age & frailty per Dr. Head and Dr. Ramírez on 10/4/17    He is a 29 mm EvolutR candidate via R TF access.    Paroxysmal atrial fibrillation: on ASA w/ h/o GIB when taking Eliquis (d/c'd); on amiodarone    Lower GI bleed: no longer on Eliquis; s/p partial colectomy    Symptomatic anemia: 13.6/41    Essential hypertension: controlled on amlodipine, enalapril    SSS (sick sinus syndrome): s/p PPM (1/2016)     AVM (arteriovenous malformation): GI tract with bleeding    Hypothyroidism: on Synthroid     PLAN:  29 mm Evolut via R TF access scheduled for Nov 28, 2017  Risks, Benefits, and alternatives of the procedure were discussed in detail with the patient. Questions were answered and patient has voiced understanding. She is agreeable to proceed. Informed consent obtained.   ASA alone after TAVR s/t GIB.     -PCP, Dr. Jeromy James, coordinated pelvic ultrasound and will schedule 6 mo f/u CT for further assessment of pulm nodule.    Staff:  I have personally taken the history and examined this patient and agree with the fellow's note as stated above and amended it accordingly :-) She has prehab'ed very well.  Will proceed with TAVR tomorrow.

## 2017-11-28 ENCOUNTER — HOSPITAL ENCOUNTER (INPATIENT)
Facility: HOSPITAL | Age: 82
LOS: 1 days | Discharge: HOME | DRG: 266 | End: 2017-11-29
Attending: INTERNAL MEDICINE | Admitting: INTERNAL MEDICINE
Payer: MEDICARE

## 2017-11-28 ENCOUNTER — ANESTHESIA (OUTPATIENT)
Dept: MEDSURG UNIT | Facility: HOSPITAL | Age: 82
DRG: 266 | End: 2017-11-28
Payer: MEDICARE

## 2017-11-28 ENCOUNTER — ANESTHESIA EVENT (OUTPATIENT)
Dept: MEDSURG UNIT | Facility: HOSPITAL | Age: 82
DRG: 266 | End: 2017-11-28
Payer: MEDICARE

## 2017-11-28 DIAGNOSIS — I35.0 NODULAR CALCIFIC AORTIC VALVE STENOSIS: ICD-10-CM

## 2017-11-28 DIAGNOSIS — K63.5 POLYP OF COLON: ICD-10-CM

## 2017-11-28 DIAGNOSIS — I35.0 AORTIC VALVE STENOSIS, ETIOLOGY OF CARDIAC VALVE DISEASE UNSPECIFIED: Primary | ICD-10-CM

## 2017-11-28 LAB
ABO + RH BLD: NORMAL
BLD GP AB SCN CELLS X3 SERPL QL: NORMAL
GLUCOSE SERPL-MCNC: 126 MG/DL (ref 70–110)
HCO3 UR-SCNC: 25.5 MMOL/L (ref 24–28)
HCT VFR BLD CALC: 36 %PCV (ref 36–54)
PCO2 BLDA: 42.1 MMHG (ref 35–45)
PH SMN: 7.39 [PH] (ref 7.35–7.45)
PO2 BLDA: 248 MMHG (ref 80–100)
POC ACTIVATED CLOTTING TIME K: 109 SEC (ref 74–137)
POC ACTIVATED CLOTTING TIME K: 125 SEC (ref 74–137)
POC ACTIVATED CLOTTING TIME K: 230 SEC (ref 74–137)
POC ACTIVATED CLOTTING TIME K: 263 SEC (ref 74–137)
POC BE: 1 MMOL/L
POC IONIZED CALCIUM: 1.16 MMOL/L (ref 1.06–1.42)
POC SATURATED O2: 100 % (ref 95–100)
POC TCO2: 27 MMOL/L (ref 23–27)
POTASSIUM BLD-SCNC: 4.1 MMOL/L (ref 3.5–5.1)
SAMPLE: ABNORMAL
SAMPLE: NORMAL
SAMPLE: NORMAL
SODIUM BLD-SCNC: 137 MMOL/L (ref 136–145)

## 2017-11-28 PROCEDURE — 63600175 PHARM REV CODE 636 W HCPCS: Performed by: ANESTHESIOLOGY

## 2017-11-28 PROCEDURE — 93010 ELECTROCARDIOGRAM REPORT: CPT | Mod: ,,, | Performed by: INTERNAL MEDICINE

## 2017-11-28 PROCEDURE — 36620 INSERTION CATHETER ARTERY: CPT | Mod: 59,,, | Performed by: ANESTHESIOLOGY

## 2017-11-28 PROCEDURE — 27100021 HC MULTIPORT INFUSION MANIFOLD: Performed by: ANESTHESIOLOGY

## 2017-11-28 PROCEDURE — 25000003 PHARM REV CODE 250: Performed by: INTERNAL MEDICINE

## 2017-11-28 PROCEDURE — 63600175 PHARM REV CODE 636 W HCPCS

## 2017-11-28 PROCEDURE — C1769 GUIDE WIRE: HCPCS

## 2017-11-28 PROCEDURE — 27000239 HC STAND-BY BYPASS PUMP

## 2017-11-28 PROCEDURE — 27100025 HC TUBING, SET FLUID WARMER: Performed by: ANESTHESIOLOGY

## 2017-11-28 PROCEDURE — 37000008 HC ANESTHESIA 1ST 15 MINUTES: Performed by: INTERNAL MEDICINE

## 2017-11-28 PROCEDURE — 33361 REPLACE AORTIC VALVE PERQ: CPT | Mod: 62,Q0,, | Performed by: THORACIC SURGERY (CARDIOTHORACIC VASCULAR SURGERY)

## 2017-11-28 PROCEDURE — 37000009 HC ANESTHESIA EA ADD 15 MINS: Performed by: INTERNAL MEDICINE

## 2017-11-28 PROCEDURE — 27200676 HC TRANSDUCER MONITOR KIT DOUBLE: Performed by: ANESTHESIOLOGY

## 2017-11-28 PROCEDURE — 25000003 PHARM REV CODE 250: Performed by: ANESTHESIOLOGY

## 2017-11-28 PROCEDURE — 63600175 PHARM REV CODE 636 W HCPCS: Performed by: INTERNAL MEDICINE

## 2017-11-28 PROCEDURE — C1751 CATH, INF, PER/CENT/MIDLINE: HCPCS | Performed by: ANESTHESIOLOGY

## 2017-11-28 PROCEDURE — 93005 ELECTROCARDIOGRAM TRACING: CPT

## 2017-11-28 PROCEDURE — 33361 REPLACE AORTIC VALVE PERQ: CPT | Mod: 62,Q0,, | Performed by: INTERNAL MEDICINE

## 2017-11-28 PROCEDURE — 25000003 PHARM REV CODE 250

## 2017-11-28 PROCEDURE — C1894 INTRO/SHEATH, NON-LASER: HCPCS

## 2017-11-28 PROCEDURE — D9220A PRA ANESTHESIA: Mod: ,,, | Performed by: ANESTHESIOLOGY

## 2017-11-28 PROCEDURE — A4216 STERILE WATER/SALINE, 10 ML: HCPCS | Performed by: ANESTHESIOLOGY

## 2017-11-28 PROCEDURE — S5010 5% DEXTROSE AND 0.45% SALINE: HCPCS | Performed by: INTERNAL MEDICINE

## 2017-11-28 PROCEDURE — 20000000 HC ICU ROOM

## 2017-11-28 PROCEDURE — 86900 BLOOD TYPING SEROLOGIC ABO: CPT

## 2017-11-28 PROCEDURE — 86901 BLOOD TYPING SEROLOGIC RH(D): CPT

## 2017-11-28 PROCEDURE — 02RF38Z REPLACEMENT OF AORTIC VALVE WITH ZOOPLASTIC TISSUE, PERCUTANEOUS APPROACH: ICD-10-PCS | Performed by: THORACIC SURGERY (CARDIOTHORACIC VASCULAR SURGERY)

## 2017-11-28 PROCEDURE — 27201037 HC PRESSURE MONITORING SET UP

## 2017-11-28 RX ORDER — POTASSIUM CHLORIDE 20 MEQ/15ML
60 SOLUTION ORAL
Status: DISCONTINUED | OUTPATIENT
Start: 2017-11-28 | End: 2017-11-29 | Stop reason: HOSPADM

## 2017-11-28 RX ORDER — POTASSIUM CHLORIDE 20 MEQ/15ML
40 SOLUTION ORAL
Status: DISCONTINUED | OUTPATIENT
Start: 2017-11-28 | End: 2017-11-29 | Stop reason: HOSPADM

## 2017-11-28 RX ORDER — SODIUM CHLORIDE 9 MG/ML
INJECTION, SOLUTION INTRAVENOUS CONTINUOUS
Status: ACTIVE | OUTPATIENT
Start: 2017-11-28 | End: 2017-11-28

## 2017-11-28 RX ORDER — SODIUM,POTASSIUM PHOSPHATES 280-250MG
2 POWDER IN PACKET (EA) ORAL
Status: DISCONTINUED | OUTPATIENT
Start: 2017-11-28 | End: 2017-11-29 | Stop reason: HOSPADM

## 2017-11-28 RX ORDER — AMIODARONE HYDROCHLORIDE 200 MG/1
1 TABLET ORAL NIGHTLY
Status: DISCONTINUED | OUTPATIENT
Start: 2017-11-28 | End: 2017-11-28

## 2017-11-28 RX ORDER — SODIUM CHLORIDE 9 MG/ML
INJECTION, SOLUTION INTRAVENOUS CONTINUOUS PRN
Status: DISCONTINUED | OUTPATIENT
Start: 2017-11-28 | End: 2017-11-28

## 2017-11-28 RX ORDER — LANOLIN ALCOHOL/MO/W.PET/CERES
800 CREAM (GRAM) TOPICAL
Status: DISCONTINUED | OUTPATIENT
Start: 2017-11-28 | End: 2017-11-29 | Stop reason: HOSPADM

## 2017-11-28 RX ORDER — HYDROMORPHONE HYDROCHLORIDE 1 MG/ML
0.2 INJECTION, SOLUTION INTRAMUSCULAR; INTRAVENOUS; SUBCUTANEOUS EVERY 5 MIN PRN
Status: DISCONTINUED | OUTPATIENT
Start: 2017-11-28 | End: 2017-11-28

## 2017-11-28 RX ORDER — ACETAMINOPHEN 325 MG/1
650 TABLET ORAL EVERY 4 HOURS PRN
Status: DISCONTINUED | OUTPATIENT
Start: 2017-11-28 | End: 2017-11-29 | Stop reason: HOSPADM

## 2017-11-28 RX ORDER — HEPARIN SODIUM 1000 [USP'U]/ML
INJECTION, SOLUTION INTRAVENOUS; SUBCUTANEOUS
Status: DISCONTINUED | OUTPATIENT
Start: 2017-11-28 | End: 2017-11-28

## 2017-11-28 RX ORDER — PROTAMINE SULFATE 10 MG/ML
INJECTION, SOLUTION INTRAVENOUS
Status: DISCONTINUED | OUTPATIENT
Start: 2017-11-28 | End: 2017-11-28

## 2017-11-28 RX ORDER — CEFAZOLIN SODIUM 1 G/3ML
INJECTION, POWDER, FOR SOLUTION INTRAMUSCULAR; INTRAVENOUS
Status: DISCONTINUED | OUTPATIENT
Start: 2017-11-28 | End: 2017-11-28

## 2017-11-28 RX ORDER — ONDANSETRON 2 MG/ML
4 INJECTION INTRAMUSCULAR; INTRAVENOUS EVERY 12 HOURS PRN
Status: DISCONTINUED | OUTPATIENT
Start: 2017-11-28 | End: 2017-11-29 | Stop reason: HOSPADM

## 2017-11-28 RX ORDER — SUCRALFATE 1 G/1
1 TABLET ORAL
Status: DISCONTINUED | OUTPATIENT
Start: 2017-11-28 | End: 2017-11-29 | Stop reason: HOSPADM

## 2017-11-28 RX ORDER — DIPHENHYDRAMINE HCL 50 MG
50 CAPSULE ORAL ONCE
Status: COMPLETED | OUTPATIENT
Start: 2017-11-28 | End: 2017-11-28

## 2017-11-28 RX ORDER — LEVOTHYROXINE SODIUM 25 UG/1
25 TABLET ORAL
Status: DISCONTINUED | OUTPATIENT
Start: 2017-11-29 | End: 2017-11-29 | Stop reason: HOSPADM

## 2017-11-28 RX ORDER — ONDANSETRON 2 MG/ML
4 INJECTION INTRAMUSCULAR; INTRAVENOUS DAILY PRN
Status: DISCONTINUED | OUTPATIENT
Start: 2017-11-28 | End: 2017-11-28 | Stop reason: HOSPADM

## 2017-11-28 RX ORDER — FLUOXETINE 10 MG/1
20 CAPSULE ORAL NIGHTLY
Status: DISCONTINUED | OUTPATIENT
Start: 2017-11-28 | End: 2017-11-29 | Stop reason: HOSPADM

## 2017-11-28 RX ORDER — SODIUM CHLORIDE 0.9 % (FLUSH) 0.9 %
3 SYRINGE (ML) INJECTION
Status: DISCONTINUED | OUTPATIENT
Start: 2017-11-28 | End: 2017-11-28 | Stop reason: HOSPADM

## 2017-11-28 RX ORDER — DEXTROSE MONOHYDRATE AND SODIUM CHLORIDE 5; .45 G/100ML; G/100ML
INJECTION, SOLUTION INTRAVENOUS CONTINUOUS
Status: DISCONTINUED | OUTPATIENT
Start: 2017-11-28 | End: 2017-11-28

## 2017-11-28 RX ORDER — FENTANYL CITRATE 50 UG/ML
INJECTION, SOLUTION INTRAMUSCULAR; INTRAVENOUS
Status: DISCONTINUED | OUTPATIENT
Start: 2017-11-28 | End: 2017-11-28

## 2017-11-28 RX ORDER — CEFAZOLIN SODIUM 2 G/50ML
2 SOLUTION INTRAVENOUS
Status: COMPLETED | OUTPATIENT
Start: 2017-11-28 | End: 2017-11-29

## 2017-11-28 RX ORDER — ASPIRIN 81 MG/1
81 TABLET ORAL DAILY
Status: DISCONTINUED | OUTPATIENT
Start: 2017-11-29 | End: 2017-11-29 | Stop reason: HOSPADM

## 2017-11-28 RX ORDER — GABAPENTIN 400 MG/1
400 CAPSULE ORAL 2 TIMES DAILY
Status: DISCONTINUED | OUTPATIENT
Start: 2017-11-28 | End: 2017-11-29 | Stop reason: HOSPADM

## 2017-11-28 RX ORDER — KETAMINE HCL IN 0.9 % NACL 50 MG/5 ML
SYRINGE (ML) INTRAVENOUS
Status: DISCONTINUED | OUTPATIENT
Start: 2017-11-28 | End: 2017-11-28

## 2017-11-28 RX ORDER — PANTOPRAZOLE SODIUM 40 MG/1
40 TABLET, DELAYED RELEASE ORAL DAILY
Status: DISCONTINUED | OUTPATIENT
Start: 2017-11-28 | End: 2017-11-29 | Stop reason: HOSPADM

## 2017-11-28 RX ORDER — ATORVASTATIN CALCIUM 20 MG/1
20 TABLET, FILM COATED ORAL NIGHTLY
Status: DISCONTINUED | OUTPATIENT
Start: 2017-11-28 | End: 2017-11-29 | Stop reason: HOSPADM

## 2017-11-28 RX ORDER — AMIODARONE HYDROCHLORIDE 200 MG/1
200 TABLET ORAL NIGHTLY
Status: DISCONTINUED | OUTPATIENT
Start: 2017-11-28 | End: 2017-11-29 | Stop reason: HOSPADM

## 2017-11-28 RX ORDER — NOREPINEPHRINE BITARTRATE/D5W 4MG/250ML
0.02 PLASTIC BAG, INJECTION (ML) INTRAVENOUS CONTINUOUS
Status: DISCONTINUED | OUTPATIENT
Start: 2017-11-28 | End: 2017-11-29 | Stop reason: HOSPADM

## 2017-11-28 RX ORDER — DIPHENHYDRAMINE HYDROCHLORIDE 50 MG/ML
INJECTION INTRAMUSCULAR; INTRAVENOUS
Status: DISCONTINUED | OUTPATIENT
Start: 2017-11-28 | End: 2017-11-28

## 2017-11-28 RX ADMIN — DEXMEDETOMIDINE HYDROCHLORIDE 1 MCG/KG/HR: 100 INJECTION, SOLUTION, CONCENTRATE INTRAVENOUS at 07:11

## 2017-11-28 RX ADMIN — SODIUM CHLORIDE: 0.9 INJECTION, SOLUTION INTRAVENOUS at 07:11

## 2017-11-28 RX ADMIN — FENTANYL CITRATE 50 MCG: 50 INJECTION, SOLUTION INTRAMUSCULAR; INTRAVENOUS at 07:11

## 2017-11-28 RX ADMIN — PROTAMINE SULFATE 100 MG: 10 INJECTION, SOLUTION INTRAVENOUS at 09:11

## 2017-11-28 RX ADMIN — HEPARIN SODIUM 5000 UNITS: 1000 INJECTION INTRAVENOUS; SUBCUTANEOUS at 09:11

## 2017-11-28 RX ADMIN — FLUOXETINE 20 MG: 10 CAPSULE ORAL at 08:11

## 2017-11-28 RX ADMIN — FENTANYL CITRATE 10 MCG: 50 INJECTION, SOLUTION INTRAMUSCULAR; INTRAVENOUS at 08:11

## 2017-11-28 RX ADMIN — AMIODARONE HYDROCHLORIDE 200 MG: 200 TABLET ORAL at 08:11

## 2017-11-28 RX ADMIN — DIPHENHYDRAMINE HYDROCHLORIDE 50 MG: 50 CAPSULE ORAL at 07:11

## 2017-11-28 RX ADMIN — SODIUM CHLORIDE, SODIUM GLUCONATE, SODIUM ACETATE, POTASSIUM CHLORIDE, MAGNESIUM CHLORIDE, SODIUM PHOSPHATE, DIBASIC, AND POTASSIUM PHOSPHATE: .53; .5; .37; .037; .03; .012; .00082 INJECTION, SOLUTION INTRAVENOUS at 07:11

## 2017-11-28 RX ADMIN — ATORVASTATIN CALCIUM 20 MG: 20 TABLET, FILM COATED ORAL at 08:11

## 2017-11-28 RX ADMIN — Medication 20 MG: at 08:11

## 2017-11-28 RX ADMIN — HEPARIN SODIUM 9000 UNITS: 1000 INJECTION INTRAVENOUS; SUBCUTANEOUS at 08:11

## 2017-11-28 RX ADMIN — SODIUM CHLORIDE 125 ML/HR: 0.9 INJECTION, SOLUTION INTRAVENOUS at 10:11

## 2017-11-28 RX ADMIN — GABAPENTIN 400 MG: 400 CAPSULE ORAL at 08:11

## 2017-11-28 RX ADMIN — DEXTROSE MONOHYDRATE AND SODIUM CHLORIDE: 5; .45 INJECTION, SOLUTION INTRAVENOUS at 07:11

## 2017-11-28 RX ADMIN — CEFAZOLIN 2 G: 1 INJECTION, POWDER, FOR SOLUTION INTRAMUSCULAR; INTRAVENOUS; PARENTERAL at 08:11

## 2017-11-28 RX ADMIN — CEFAZOLIN SODIUM 2 G: 2 SOLUTION INTRAVENOUS at 04:11

## 2017-11-28 RX ADMIN — SUCRALFATE 1 G: 1 TABLET ORAL at 04:11

## 2017-11-28 RX ADMIN — DIPHENHYDRAMINE HYDROCHLORIDE 12.5 MG: 50 INJECTION, SOLUTION INTRAMUSCULAR; INTRAVENOUS at 08:11

## 2017-11-28 RX ADMIN — PANTOPRAZOLE SODIUM 40 MG: 40 TABLET, DELAYED RELEASE ORAL at 11:11

## 2017-11-28 RX ADMIN — SUCRALFATE 1 G: 1 TABLET ORAL at 08:11

## 2017-11-28 NOTE — PROGRESS NOTES
Gabapentin ordered for patient but patient was asleep once patient was awake she was able to tell me that she only takes at night.

## 2017-11-28 NOTE — PROGRESS NOTES
Spoke with patient's daughter and update given on patient location with the permission of patient.

## 2017-11-28 NOTE — PROGRESS NOTES
"Post Cath Progress Note      Subjective:   Patient has no complaints. She denies chest pain, shortness of breath, back pain, or leg pain.      Objective:   Physical Exam   /64 (BP Location: Right arm, Patient Position: Lying)   Pulse 60   Temp 98.4 °F (36.9 °C)   Resp 17   Ht 5' 1.5" (1.562 m)   Wt 79.4 kg (175 lb)   LMP  (LMP Unknown)   SpO2 96%   Breastfeeding? No   BMI 32.53 kg/m²    General: alert, awake and oriented, not in distress.  Neck: No JVD present.   Cardiovascular: Normal rate, regular rhythm, normal heart sounds and intact distal pulses.  Exam reveals no gallop and no friction rub. No murmur heard.  Pulmonary/Chest: Effort normal and breath sounds normal. No respiratory distress. He has no wheezes. He has no rales.   Abdominal: Soft. Bowel sounds are normal. He exhibits no distension. There is no tenderness. There is no rebound and no guarding.   Musculoskeletal: He no lower extremity edema. No Groin swelling or hematoma.  PULSES: The right radial pulse was 2+. The left radial pulse was 2+. The right femoral pulse was 2+. The left femoral pulse was 2+. The right post tibial pulse was 2+. The left post tibial pulse was 2+. The right dorsalis pedis pulse was 2+. The left dorsalis pedis pulse was 2+. No evidence of embolic phenomena or s/s of limb ischemia.         Assesment:  Ms. Sangeetha Morales is a pleasant 89 y.o. lady who is s/p 29 Evolut TAVR valve via right TF    Plan/Recs:  Severe AS:  S/p TAVR 29 evolut via right TF   - Post cath care per protocol.   - Aspirin 81 mg PO daily for life, only anticoagulation    Cindi Rae MD.  Cardiology Fellow.  11/28/2017 - 9:26 AM        "

## 2017-11-28 NOTE — PROGRESS NOTES
Patient admitted to recovery see [facility] for complete assessment pacu bcg's maintained safety measures verified patient instructed on pain scale. Also called patient's daughter and updated on patient location.

## 2017-11-28 NOTE — PROGRESS NOTES
[facility] Connected to bedside monitor - cardiac monitoring and continuous pulse oximetry applied. R radial arterial line in place. L and R groin sites C/D/I. Incentive spirometer at bedside and pt up in chair. Call bell within reach, side rails raised x 2, bed locked and in lowest position. Primary service notified of patient arrival. Patient in no acute distress. Will continue to monitor.

## 2017-11-28 NOTE — ANESTHESIA PREPROCEDURE EVALUATION
11/28/2017  Pre-operative evaluation for Procedure(s) (LRB):  REPLACEMENT-VALVE-AORTIC (N/A)    Sangeetha Morales is a 89 y.o. female hx of pafib, HTN, severe AS, SSS (ppm), hypothyroidism who presents for above procedure.      Airway: Method of Intubation: Direct laryngoscopy; Inserted by: CRNA; Airway Device: Endotracheal Tube; Induction type: IV - routine; Mask Ventilation: Easy; Intubated: Postinduction; Blade: Jay #1; Airway Device Size: 7.5; Style: Cuffed; Cuff Inflation: Minimal occlusive pressure; Inflation Amount: 4; Placement Verified By: Auscultation, Capnometry; Grade: Grade I; Complicating Factors: None; Intubation Findings: Positive EtCO2, Bilateral breath sounds, Atraumatic/Condition of teeth unchanged;  Depth of Insertion: 20; Securment: Lips; Complications: None; Breath Sounds: Equal Bilateral; Insertion Attempts: 1;     Patient Active Problem List   Diagnosis    Colon polyps    Paroxysmal atrial fibrillation    Lower GI bleed    Symptomatic anemia    Essential hypertension    Aortic stenosis    Atrial fibrillation    SSS (sick sinus syndrome)    AVM (arteriovenous malformation)    Hypothyroidism    Hx of cardiac pacemaker       Review of patient's allergies indicates:  No Known Allergies    No current facility-administered medications on file prior to encounter.      Current Outpatient Prescriptions on File Prior to Encounter   Medication Sig Dispense Refill    acetaminophen (TYLENOL) 325 MG tablet Take 325 mg by mouth every 6 (six) hours as needed for Pain.      amiodarone (PACERONE) 200 MG Tab Take 1 mg by mouth every evening.       amlodipine (NORVASC) 10 MG tablet Take 10 mg by mouth once daily.      aspirin (ECOTRIN) 81 MG EC tablet Take 1 tablet (81 mg total) by mouth once daily.  0    atorvastatin (LIPITOR) 20 MG tablet Take 20 mg by mouth every evening.        enalapril (VASOTEC) 20 MG tablet Take 20 mg by mouth every evening.       ferrous gluconate (FERGON) 324 MG tablet Take 1 tablet (324 mg total) by mouth 3 (three) times daily with meals.      fluoxetine (PROZAC) 20 MG capsule Take 20 mg by mouth every evening.   3    folic acid (FOLVITE) 1 MG tablet Take 1 mg by mouth once daily.      furosemide (LASIX) 20 MG tablet Take 20 mg by mouth twice a week. Wed and sat      gabapentin (NEURONTIN) 400 MG capsule Take 400 mg by mouth 2 (two) times daily.      ketoconazole (NIZORAL) 2 % shampoo APPLY to scalp 2-3 times per week  4    levothyroxine (SYNTHROID) 25 MCG tablet Take 1 tablet (25 mcg total) by mouth before breakfast. 30 tablet 0    pantoprazole (PROTONIX) 40 MG tablet Take 1 tablet (40 mg total) by mouth once daily. 30 tablet 0    polyethylene glycol (GLYCOLAX) 17 gram PwPk Take 17 g by mouth once daily.  0    sucralfate (CARAFATE) 1 gram tablet 4 (four) times daily before meals and nightly.   6       Past Surgical History:   Procedure Laterality Date    CARDIOVERSION  12/2014    CARPAL TUNNEL RELEASE Bilateral     CATARACT EXTRACTION Bilateral     HYSTERECTOMY      bso    JOINT REPLACEMENT      RIGHT COLECTOMY  10/06/2016    TOTAL KNEE ARTHROPLASTY Right        Social History     Social History    Marital status:      Spouse name: N/A    Number of children: N/A    Years of education: N/A     Occupational History    Not on file.     Social History Main Topics    Smoking status: Never Smoker    Smokeless tobacco: Never Used    Alcohol use No    Drug use: No    Sexual activity: Not on file     Other Topics Concern    Not on file     Social History Narrative    No narrative on file         Vital Signs Range (Last 24H):  Pulse:  [55-60]   BP: (112-128)/(67-70)   SpO2:  [96 %]       CBC:   Recent Labs      11/27/17   1210   WBC  9.89   RBC  4.43   HGB  13.6   HCT  41.1   PLT  276   MCV  93   MCH  30.7   MCHC  33.1       CMP:   Recent  Labs      11/27/17   1210   NA  137   K  4.0   CL  103   CO2  26   BUN  16   CREATININE  1.0   GLU  118*   CALCIUM  9.4   ALBUMIN  3.8       INR  Recent Labs      11/27/17   1210   APTT  22.6     Diagnostic Studies:  CTA: 1.0 cm sub-solid pulmonary nodule in the left upper lobe. Finding is nonspecific and could represent infection, inflammation, with neoplasm a possibility.  Recommend followup in 6 months (4/2018).    Indeterminate cystic structure the right adnexa.  Recommend dedicated pelvic ultrasound for further assessment.    TAVR measurements as detailed above.    Moderate aortic valve calcification.    Small hiatal hernia.    Postsurgical changes of right hemicolectomy with ileocolic anastomosis.    EKG:  Dual chamber pacing  Compared to ECG 11/30/2016 11:54:11  Uncertain supraventricular rhythm now present  Sinus bradycardia no longer present  First degree AV block no longer present  Bifascicular block still present    2D Echo:   1 - Concentric hypertrophy.     2 - Normal left ventricular systolic function (EF 65-70%).     3 - Normal right ventricular systolic function .     4 - Impaired LV relaxation, elevated LAP (grade 2 diastolic dysfunction).     5 - Biatrial enlargement.     6 - Mild to moderate tricuspid regurgitation.     7 - Severe aortic stenosis, JAZZMINE = 0.67 cm2, peak velocity = 5.0 m/s, mean gradient = 58 mmHg.     8 - The estimated PA systolic pressure is 40 mmHg.     9 - Low central venous pressure.       Anesthesia Evaluation    I have reviewed the Patient Summary Reports.    I have reviewed the Nursing Notes.   I have reviewed the Medications.     Review of Systems  Anesthesia Hx:  No problems with previous Anesthesia    Social:  Non-Smoker, No Alcohol Use    Cardiovascular:   Hypertension Valvular problems/Murmurs, AS Dysrhythmias atrial fibrillation ECG has been reviewed. 10/20/16 Sinus bradycardia with premature atrial complexes  Right bundle branch block  Left anterior fascicular block    Bifascicular block     10/21/16 2D Echo:  CONCLUSIONS     1 - Severe aortic stenosis, mean gradient = 64.0 mmHg.     2 - Normal left ventricular systolic function (EF 60-65%).     3 - Left ventricular diastolic dysfunction.    Pulmonary:  Pulmonary Normal    Hepatic/GI:  Hepatic/GI Normal    Musculoskeletal:   Arthritis   Spine Disorders: Chronic Pain    Endocrine:  Endocrine Normal        Physical Exam  General:  Well nourished, Obesity    Airway/Jaw/Neck:  Airway Findings: Mouth Opening: Normal Tongue: Normal  General Airway Assessment: Adult  Mallampati: II  TM Distance: Normal, at least 6 cm  Jaw/Neck Findings:  Neck ROM: Normal ROM       Chest/Lungs:  Chest/Lungs Findings: Clear to auscultation, Normal Respiratory Rate     Heart/Vascular:  Heart Findings: Rate: Normal  Rhythm: Regular Rhythm        Mental Status:  Mental Status Findings:  Cooperative         Anesthesia Plan  Type of Anesthesia, risks & benefits discussed:  Anesthesia Type:  general  Patient's Preference:   Intra-op Monitoring Plan: arterial line and standard ASA monitors  Intra-op Monitoring Plan Comments:   Post Op Pain Control Plan: per primary service following discharge from PACU  Post Op Pain Control Plan Comments:   Induction:   IV  Beta Blocker:  Patient is not currently on a Beta-Blocker (No further documentation required).       Informed Consent: Patient understands risks and agrees with Anesthesia plan.  Questions answered. Anesthesia consent signed with patient.  ASA Score: 4     Day of Surgery Review of History & Physical:    H&P update referred to the surgeon.     Anesthesia Plan Notes: Arterial line, precedex based anesthetic        Ready For Surgery From Anesthesia Perspective.

## 2017-11-28 NOTE — NURSING TRANSFER
Nursing Transfer Note      11/28/2017     Transfer To: 3070    Transfer via stretcher    Transfer with cardiac monitoring portable o2 and monitor    Transported by rn    Medicines sent: none    Chart send with patient: Yes

## 2017-11-28 NOTE — ANESTHESIA PROCEDURE NOTES
Arterial    Diagnosis: aortic stenosis  Doctor requesting consult: lelo    Patient location during procedure: done in OR  Procedure start time: 11/28/2017 7:41 AM  Timeout: 11/28/2017 8:12 AM  Procedure end time: 11/28/2017 7:55 AM  Staffing  Anesthesiologist: SONAL WHITTAKER  Other anesthesia staff: LETHA CASTILLO  Performed: anesthesiologist and other anesthesia staff   Anesthesiologist was present at the time of the procedure.  Preanesthetic Checklist  Completed: patient identified, site marked, surgical consent, pre-op evaluation, timeout performed, IV checked, risks and benefits discussed, monitors and equipment checked and anesthesia consent givenArterial  Skin Prep: chlorhexidine gluconate and isopropyl alcohol  Local Infiltration: lidocaine  Orientation: right  Location: radial  Catheter Size: 22 G  Catheter placement by Ultrasound guidance. Heme positive aspiration all ports.  Vessel Caliber: small, patent, compressibility normal  Needle advanced into vessel with real time Ultrasound guidance.  Guidewire confirmed in vessel.Insertion Attempts: 3  Assessment  Dressing: secured with tape and tegaderm  Patient: Tolerated well

## 2017-11-28 NOTE — NURSING
Pt ambulated on unit with walker.  Daughter at side.  Denies pain or SOB.  Resp even and unlabored.  Verbalized understanding of procedure.  Will continue to monitor.

## 2017-11-28 NOTE — ANESTHESIA PREPROCEDURE EVALUATION
11/28/2017  Sangeetha Morales is a 89 y.o., female.  Pre-operative evaluation for Procedure(s) (LRB):  REPLACEMENT-VALVE-AORTIC (N/A)    Sangeetha Morales is a 89 y.o. female     Patient Active Problem List   Diagnosis    Colon polyps    Paroxysmal atrial fibrillation    Lower GI bleed    Symptomatic anemia    Essential hypertension    Aortic stenosis    Atrial fibrillation    SSS (sick sinus syndrome)    AVM (arteriovenous malformation)    Hypothyroidism    Hx of cardiac pacemaker       Review of patient's allergies indicates:  No Known Allergies    No current facility-administered medications on file prior to encounter.      Current Outpatient Prescriptions on File Prior to Encounter   Medication Sig Dispense Refill    acetaminophen (TYLENOL) 325 MG tablet Take 325 mg by mouth every 6 (six) hours as needed for Pain.      amiodarone (PACERONE) 200 MG Tab Take 1 mg by mouth every evening.       amlodipine (NORVASC) 10 MG tablet Take 10 mg by mouth once daily.      aspirin (ECOTRIN) 81 MG EC tablet Take 1 tablet (81 mg total) by mouth once daily.  0    atorvastatin (LIPITOR) 20 MG tablet Take 20 mg by mouth every evening.       enalapril (VASOTEC) 20 MG tablet Take 20 mg by mouth every evening.       ferrous gluconate (FERGON) 324 MG tablet Take 1 tablet (324 mg total) by mouth 3 (three) times daily with meals.      fluoxetine (PROZAC) 20 MG capsule Take 20 mg by mouth every evening.   3    folic acid (FOLVITE) 1 MG tablet Take 1 mg by mouth once daily.      furosemide (LASIX) 20 MG tablet Take 20 mg by mouth twice a week. Wed and sat      gabapentin (NEURONTIN) 400 MG capsule Take 400 mg by mouth 2 (two) times daily.      ketoconazole (NIZORAL) 2 % shampoo APPLY to scalp 2-3 times per week  4    levothyroxine (SYNTHROID) 25 MCG tablet Take 1 tablet (25 mcg total) by mouth before breakfast. 30  tablet 0    pantoprazole (PROTONIX) 40 MG tablet Take 1 tablet (40 mg total) by mouth once daily. 30 tablet 0    polyethylene glycol (GLYCOLAX) 17 gram PwPk Take 17 g by mouth once daily.  0    sucralfate (CARAFATE) 1 gram tablet 4 (four) times daily before meals and nightly.   6       Past Surgical History:   Procedure Laterality Date    CARDIOVERSION  2014    CARPAL TUNNEL RELEASE Bilateral     CATARACT EXTRACTION Bilateral     HYSTERECTOMY      bso    JOINT REPLACEMENT      RIGHT COLECTOMY  10/06/2016    TOTAL KNEE ARTHROPLASTY Right        Social History     Social History    Marital status:      Spouse name: N/A    Number of children: N/A    Years of education: N/A     Occupational History    Not on file.     Social History Main Topics    Smoking status: Never Smoker    Smokeless tobacco: Never Used    Alcohol use No    Drug use: No    Sexual activity: Not on file     Other Topics Concern    Not on file     Social History Narrative    No narrative on file         Vital Signs Range (Last 24H):  Pulse:  [55-60]   BP: (112-128)/(67-70)   SpO2:  [96 %]       CBC:   Recent Labs      17   1210   WBC  9.89   RBC  4.43   HGB  13.6   HCT  41.1   PLT  276   MCV  93   MCH  30.7   MCHC  33.1       CMP:   Recent Labs      17   1210   NA  137   K  4.0   CL  103   CO2  26   BUN  16   CREATININE  1.0   GLU  118*   CALCIUM  9.4   ALBUMIN  3.8       INR  Recent Labs      17   1210   APTT  22.6           Diagnostic Studies:      EKD Echo: 10/4/17   CONCLUSIONS     1 - Concentric hypertrophy.     2 - Normal left ventricular systolic function (EF 65-70%).     3 - Normal right ventricular systolic function .     4 - Impaired LV relaxation, elevated LAP (grade 2 diastolic dysfunction).     5 - Biatrial enlargement.     6 - Mild to moderate tricuspid regurgitation.     7 - Severe aortic stenosis, JAZZMINE = 0.67 cm2, peak velocity = 5.0 m/s, mean gradient = 58 mmHg.     8 - The  estimated PA systolic pressure is 40 mmHg.     9 - Low central venous pressure.       Pre-op Assessment    I have reviewed the Patient Summary Reports.     I have reviewed the Nursing Notes.   I have reviewed the Medications.     Review of Systems  Social:  Non-Smoker, No Alcohol Use    Hematology/Oncology:         -- Anemia:   Cardiovascular:   Hypertension Dysrhythmias atrial fibrillation hyperlipidemia    Hepatic/GI:   GERD Colon polyps  GI bleed in past    Musculoskeletal:  Musculoskeletal Normal    Neurological:  Neurology Normal    Endocrine:   Hypothyroidism    Dermatological:  Skin Normal    Psych:  Psychiatric Normal           Physical Exam  General:  Well nourished    Airway/Jaw/Neck:  Airway Findings: Mouth Opening: Normal Tongue: Normal  Mallampati: I  TM Distance: Normal, at least 6 cm  Jaw/Neck Findings:  Neck ROM: Normal ROM, Normal Extension, Painful  Neck Findings:      Dental:  Dental Findings: In tact    Chest/Lungs:  Chest/Lungs Findings: Clear to auscultation, Normal Respiratory Rate     Heart/Vascular:  Heart Findings: Rate: Normal  Vascular Findings:     Abdomen:  Abdomen Findings:  Normal     Musculoskeletal:  Musculoskeletal Findings:    Skin:  Skin Findings:     Mental Status:  Mental Status Findings:  Cooperative, Alert and Oriented         Anesthesia Plan  Type of Anesthesia, risks & benefits discussed:  Anesthesia Type:  general  Patient's Preference:   Intra-op Monitoring Plan: standard ASA monitors, arterial line and central line  Intra-op Monitoring Plan Comments:   Post Op Pain Control Plan: per primary service following discharge from PACU and multimodal analgesia  Post Op Pain Control Plan Comments:   Induction:   IV  Beta Blocker:  Patient is not currently on a Beta-Blocker (No further documentation required).       Informed Consent: Patient understands risks and agrees with Anesthesia plan.  Questions answered. Anesthesia consent signed with patient.  ASA Score: 4     Day of  Surgery Review of History & Physical: I have interviewed and examined the patient. I have reviewed the patient's H&P dated:    H&P update referred to the surgeon.         Ready For Surgery From Anesthesia Perspective.

## 2017-11-28 NOTE — TRANSFER OF CARE
"Anesthesia Transfer of Care Note    Patient: Sangeetha Morales    Procedure(s) Performed: Procedure(s) (LRB):  REPLACEMENT-VALVE-AORTIC (N/A)    Patient location: PACU    Anesthesia Type: general    Transport from OR: Transported from OR on 6-10 L/min O2 by face mask with adequate spontaneous ventilation    Post pain: adequate analgesia    Post assessment: no apparent anesthetic complications and tolerated procedure well    Post vital signs: stable    Level of consciousness: sedated    Nausea/Vomiting: no nausea/vomiting    Complications: none    Transfer of care protocol was followed      Last vitals:   Visit Vitals  /64 (BP Location: Right arm, Patient Position: Lying)   Pulse 60   Temp 36.9 °C (98.4 °F)   Resp 17   Ht 5' 1.5" (1.562 m)   Wt 79.4 kg (175 lb)   LMP  (LMP Unknown)   SpO2 96%   Breastfeeding? No   BMI 32.53 kg/m²     "

## 2017-11-28 NOTE — PROGRESS NOTES
Team here to see patient ok for patient to eat patient ate turkey sandwhich and drank juice tolerated well.

## 2017-11-28 NOTE — PROGRESS NOTES
Lunch relief nicki alexis stated family came in to see patient for a little bit and family given phone number to call and check on patient.

## 2017-11-28 NOTE — H&P (VIEW-ONLY)
"INTERVENT[facility]AL CARDIOLOGY  [facility]    Referring: Dr. Heller    Reason for Consult: Severe Aortic Stenosis    HISTORY OF PRESENT ILLNESS:    Sangeetha Morales is a 89 y.o. female referred by Dr. Heller for evaluation of severe AS (NYHA Class III sx). She has a PMH of HTN, HLD, AF and SSS s/p PPM 11/2016. She was told she had Aortic Stenosis this year. 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 During work-up "they cauterized my stomach" and is also s/p colectomy due to polyps. The patient reports SOB with both exertion and with talking worsening since Jan 2017. She denies PND and orthopnea. She reports LE swelling since 1/2017 for which she takes Lasix daily, increased from twice weekly. She denies chest pain, syncope, palpitations. She uses a walker and gets SOB when walking [id number]. She continues to cook and perform ADLs for herself. She ambulates ~20 minutes each day in preparation for TAVR.     she has undergone the following TAVR work-up:   ECHO (Date 10/4/17): JAZZIMNE= 0.67 cm2, MG= 58 mmHg, [address]= 5.0 m/s, EF= 65-70%.   LHC (Date 9/6/17): LM calcified (normal), LAD non-obstructive (mLAD 25% just before large diagonal branch), pLCx 25% stenosis, RCA non-obstructive (right dominant)  STS: 5.7%   Frailty: 2/4   Iliacs are >7.94 mm on L and > 8.02 mm on R   LVOT area by CTA is 4.97 cm2 and Avg Diameter is 25.2 mm (distance 28.6 mm x 21.3 mm) per Dr Cleary  Other CT findings: 1.0 cm sub-solid pulmonary nodule in the left upper lobe. Finding is nonspecific and could represent infection, inflammation, with neoplasm a possibility. PCP, Dr. Jeromy James, coordinated pelvic ultrasound and will schedule 6 mo f/u CT for further assessment of pulm nodule.  Full CODE STATUS  EKG findings: 12/2016: 1st degree HB; bifascicular block- PPM in situ  PFTs: Mild: FEV1 100% predicted, FVC 94% predicted, MVV 82% predicted, DLCO 124% predicted  6MWT: 636 ft  High risk: age & frailty per Dr. Head " and Dr. Ramírez on 10/4/17.    She is a 29 mm EvolutR candidate via R TF access.      Past Medical History:   Diagnosis Date    Afib     Anemia 10/2016    Anticoagulant long-term use     eliquis    Arthritis     Cardiomegaly     Colon polyps 10/2016    Hyperlipidemia     Hypertension        Past Surgical History:   Procedure Laterality Date    CARDIOVERSION  12/2014    CARPAL TUNNEL RELEASE Bilateral     CATARACT EXTRACTION Bilateral     HYSTERECTOMY      bso    JOINT REPLACEMENT      RIGHT COLECTOMY  10/06/2016    TOTAL KNEE ARTHROPLASTY Right        Review of Systems   Constitutional: Negative for chills and fever.   HENT: Negative for sore throat.    Eyes: Negative for photophobia and pain.   Respiratory: Positive for shortness of breath. Negative for cough and stridor.    Cardiovascular: Positive for leg swelling. Negative for chest pain, orthopnea and PND.   Gastrointestinal: Negative for diarrhea and vomiting.   Genitourinary: Negative for dysuria and hematuria.   Musculoskeletal: Negative for falls and myalgias.   Skin: Negative for itching and rash.   Neurological: Negative for focal weakness and loss of consciousness.   Psychiatric/Behavioral: The patient is nervous/anxious. The patient does not have insomnia.      Physical Exam   Constitutional: She is oriented to person, place, and time and well-developed, well-nourished, and in no distress.   HENT:   Head: Normocephalic and atraumatic.   Eyes: EOM are normal. Pupils are equal, round, and reactive to light.   Neck: Normal range of motion. Neck supple.   Cardiovascular: Normal rate and regular rhythm.  Exam reveals no gallop and no friction rub.    Murmur heard.  3/6 systolic murmur heard loudest in the second intercostal space right; also hear second intercostal space left   Pulmonary/Chest: Effort normal. She has no wheezes. She has no rales.   Abdominal: Soft. She exhibits no distension. There is no tenderness. There is no rebound and no  guarding.   Musculoskeletal: She exhibits edema.   1+ edema   Neurological: She is alert and oriented to person, place, and time.   Skin: Skin is warm.   Psychiatric: Affect normal.       CMP  Sodium   Date Value Ref Range Status   10/04/2017 142 136 - 145 mmol/L Final     Potassium   Date Value Ref Range Status   10/04/2017 3.8 3.5 - 5.1 mmol/L Final     Chloride   Date Value Ref Range Status   10/04/2017 105 95 - 110 mmol/L Final     CO2   Date Value Ref Range Status   10/04/2017 27 23 - 29 mmol/L Final     Glucose   Date Value Ref Range Status   10/04/2017 103 70 - 110 mg/dL Final     BUN, Bld   Date Value Ref Range Status   10/04/2017 15 8 - 23 mg/dL Final     Creatinine   Date Value Ref Range Status   10/04/2017 0.9 0.5 - 1.4 mg/dL Final     Calcium   Date Value Ref Range Status   10/04/2017 9.5 8.7 - 10.5 mg/dL Final     Total Protein   Date Value Ref Range Status   11/26/2016 7.0 6.0 - 8.4 g/dL Final     Albumin   Date Value Ref Range Status   10/04/2017 3.8 3.5 - 5.2 g/dL Final     Total Bilirubin   Date Value Ref Range Status   11/26/2016 0.7 0.2 - 1.3 mg/dL Final     Comment:     For infants and newborns, interpretation of results should be based  on gestational age, weight and in agreement with clinical  observations.  Premature Infant recommended reference ranges:  Up to 24 hours.............<8.0 mg/dL  Up to 48 hours............<12.0 mg/dL  3-5 days..................<15.0 mg/dL  6-29 days.................<15.0 mg/dL       Alkaline Phosphatase   Date Value Ref Range Status   11/26/2016 55 38 - 145 U/L Final     AST   Date Value Ref Range Status   11/26/2016 32 14 - 36 U/L Final     ALT   Date Value Ref Range Status   11/26/2016 28 10 - 44 U/L Final     Anion Gap   Date Value Ref Range Status   10/04/2017 10 8 - 16 mmol/L Final     eGFR if    Date Value Ref Range Status   10/04/2017 >60.0 >60 mL/min/1.73 m^2 Final     eGFR if non    Date Value Ref Range Status   10/04/2017  56.9 (A) >60 mL/min/1.73 m^2 Final     Comment:     Calculation used to obtain the estimated glomerular filtration  rate (eGFR) is the CKD-EPI equation. Since race is unknown   in our information system, the eGFR values for   -American and Non--American patients are given   for each creatinine result.       Lab Results   Component Value Date    WBC 9.89 11/27/2017    HGB 13.6 11/27/2017    HCT 41.1 11/27/2017    MCV 93 11/27/2017     11/27/2017     Lab Results   Component Value Date    APTT 27.0 11/30/2016     Lab Results   Component Value Date    INR 1.0 11/30/2016    INR 1.2 11/26/2016    INR 1.4 10/20/2016     CT 10/4/2017  1.0 cm sub-solid pulmonary nodule in the left upper lobe. Finding is nonspecific and could represent infection, inflammation, with neoplasm a possibility.  Recommend followup in 6 months (4/2018).    Indeterminate cystic structure the right adnexa.  Recommend dedicated pelvic ultrasound for further assessment.    TAVR measurements as detailed above.    Moderate aortic valve calcification.    Small hiatal hernia.    Postsurgical changes of right hemicolectomy with ileocolic anastomosis.    This report has been flagged in the [facility] medical record.    ASSESSMENT:    Patient Active Problem List   Diagnosis    Aortic stenosis: she has undergone the following TAVR work-up:   ECHO (Date 10/4/17): JAZZMINE= 0.67 cm2, MG= 58 mmHg, [address]= 5.0 m/s, EF= 65-70%.   LHC (Date 9/6/17): LM calcified (normal), LAD non-obstructive (mLAD 25% just before large diagonal branch), pLCx 25% stenosis, RCA non-obstructive (right dominant)  STS: 5.7%   Frailty: 2/4   Iliacs are >7.94 mm on L and > 8.02 mm on R   LVOT area by CTA is 4.97 cm2 and Avg Diameter is 25.2 mm (distance 28.6 mm x 21.3 mm) per Dr Cleary  Other CT findings: 1.0 cm sub-solid pulmonary nodule in the left upper lobe. Finding is nonspecific and could represent infection, inflammation, with neoplasm a possibility. PCP, Dr. Pinzon  Jacob, coordinated pelvic ultrasound and will schedule 6 mo f/u CT for further assessment of pulm nodule.  Full CODE STATUS  EKG findings: 12/2016: 1st degree HB; bifascicular block- PPM in situ  PFTs: Mild: FEV1 100% predicted, FVC 94% predicted, MVV 82% predicted, DLCO 124% predicted  6MWT: 636 ft  High risk: age & frailty per Dr. Head and Dr. Ramírez on 10/4/17    He is a 29 mm EvolutR candidate via R TF access.    Paroxysmal atrial fibrillation: on ASA w/ h/o GIB when taking Eliquis (d/c'd); on amiodarone    Lower GI bleed: no longer on Eliquis; s/p partial colectomy    Symptomatic anemia: 13.6/41    Essential hypertension: controlled on amlodipine, enalapril    SSS (sick sinus syndrome): s/p PPM (1/2016)     AVM (arteriovenous malformation): GI tract with bleeding    Hypothyroidism: on Synthroid     PLAN:  29 mm Evolut via R TF access scheduled for Nov 28, 2017  Risks, Benefits, and alternatives of the procedure were discussed in detail with the patient. Questions were answered and patient has voiced understanding. She is agreeable to proceed. Informed consent obtained.   ASA alone after TAVR s/t GIB.     -PCP, Dr. Jeromy James, coordinated pelvic ultrasound and will schedule 6 mo f/u CT for further assessment of pulm nodule.    Staff:  I have personally taken the history and examined this patient and agree with the fellow's note as stated above and amended it accordingly :-) She has prehab'ed very well.  Will proceed with TAVR tomorrow.

## 2017-11-28 NOTE — INTERVAL H&P NOTE
The patient has been examined and the H&P has been reviewed:    And I agree with above.  No changes.    Anesthesia/Surgery risks, benefits and alternative options discussed and understood by patient/family.          There are no hospital problems to display for this patient.

## 2017-11-29 VITALS
WEIGHT: 176 LBS | HEIGHT: 61 IN | BODY MASS INDEX: 33.23 KG/M2 | OXYGEN SATURATION: 100 % | RESPIRATION RATE: 30 BRPM | HEART RATE: 63 BPM | TEMPERATURE: 99 F | SYSTOLIC BLOOD PRESSURE: 133 MMHG | DIASTOLIC BLOOD PRESSURE: 62 MMHG

## 2017-11-29 PROBLEM — I50.23 ACUTE ON CHRONIC SYSTOLIC HEART FAILURE: Status: ACTIVE | Noted: 2017-11-29

## 2017-11-29 LAB
ALBUMIN SERPL BCP-MCNC: 3.1 G/DL
ALP SERPL-CCNC: 45 U/L
ALT SERPL W/O P-5'-P-CCNC: 12 U/L
ANION GAP SERPL CALC-SCNC: 6 MMOL/L
AST SERPL-CCNC: 24 U/L
BASOPHILS # BLD AUTO: 0.04 K/UL
BASOPHILS NFR BLD: 0.4 %
BILIRUB SERPL-MCNC: 0.9 MG/DL
BUN SERPL-MCNC: 11 MG/DL
CALCIUM SERPL-MCNC: 8.2 MG/DL
CHLORIDE SERPL-SCNC: 103 MMOL/L
CO2 SERPL-SCNC: 24 MMOL/L
CREAT SERPL-MCNC: 0.8 MG/DL
DIFFERENTIAL METHOD: ABNORMAL
EOSINOPHIL # BLD AUTO: 0 K/UL
EOSINOPHIL NFR BLD: 0.4 %
ERYTHROCYTE [DISTWIDTH] IN BLOOD BY AUTOMATED COUNT: 14 %
EST. GFR  (AFRICAN AMERICAN): >60 ML/MIN/1.73 M^2
EST. GFR  (NON AFRICAN AMERICAN): >60 ML/MIN/1.73 M^2
GLUCOSE SERPL-MCNC: 97 MG/DL
HCT VFR BLD AUTO: 32.6 %
HGB BLD-MCNC: 11.3 G/DL
IMM GRANULOCYTES # BLD AUTO: 0.02 K/UL
IMM GRANULOCYTES NFR BLD AUTO: 0.2 %
LYMPHOCYTES # BLD AUTO: 1.4 K/UL
LYMPHOCYTES NFR BLD: 15 %
MCH RBC QN AUTO: 32.4 PG
MCHC RBC AUTO-ENTMCNC: 34.7 G/DL
MCV RBC AUTO: 93 FL
MONOCYTES # BLD AUTO: 1.1 K/UL
MONOCYTES NFR BLD: 11 %
NEUTROPHILS # BLD AUTO: 6.9 K/UL
NEUTROPHILS NFR BLD: 73 %
NRBC BLD-RTO: 0 /100 WBC
PLATELET # BLD AUTO: 186 K/UL
PMV BLD AUTO: 9.5 FL
POTASSIUM SERPL-SCNC: 3.9 MMOL/L
PROT SERPL-MCNC: 5.5 G/DL
RBC # BLD AUTO: 3.49 M/UL
SODIUM SERPL-SCNC: 133 MMOL/L
WBC # BLD AUTO: 9.52 K/UL

## 2017-11-29 PROCEDURE — 97161 PT EVAL LOW COMPLEX 20 MIN: CPT

## 2017-11-29 PROCEDURE — 99223 1ST HOSP IP/OBS HIGH 75: CPT | Mod: AI,,, | Performed by: INTERNAL MEDICINE

## 2017-11-29 PROCEDURE — 63600175 PHARM REV CODE 636 W HCPCS: Performed by: INTERNAL MEDICINE

## 2017-11-29 PROCEDURE — 85025 COMPLETE CBC W/AUTO DIFF WBC: CPT

## 2017-11-29 PROCEDURE — 25000003 PHARM REV CODE 250: Performed by: INTERNAL MEDICINE

## 2017-11-29 PROCEDURE — 80053 COMPREHEN METABOLIC PANEL: CPT

## 2017-11-29 RX ORDER — FUROSEMIDE 10 MG/ML
20 INJECTION INTRAMUSCULAR; INTRAVENOUS ONCE
Status: COMPLETED | OUTPATIENT
Start: 2017-11-29 | End: 2017-11-29

## 2017-11-29 RX ADMIN — SUCRALFATE 1 G: 1 TABLET ORAL at 11:11

## 2017-11-29 RX ADMIN — LEVOTHYROXINE SODIUM 25 MCG: 25 TABLET ORAL at 06:11

## 2017-11-29 RX ADMIN — CEFAZOLIN SODIUM 2 G: 2 SOLUTION INTRAVENOUS at 08:11

## 2017-11-29 RX ADMIN — GABAPENTIN 400 MG: 400 CAPSULE ORAL at 08:11

## 2017-11-29 RX ADMIN — PANTOPRAZOLE SODIUM 40 MG: 40 TABLET, DELAYED RELEASE ORAL at 08:11

## 2017-11-29 RX ADMIN — ASPIRIN 81 MG: 81 TABLET, COATED ORAL at 08:11

## 2017-11-29 RX ADMIN — FUROSEMIDE 20 MG: 10 INJECTION, SOLUTION INTRAMUSCULAR; INTRAVENOUS at 08:11

## 2017-11-29 RX ADMIN — CEFAZOLIN SODIUM 2 G: 2 SOLUTION INTRAVENOUS at 12:11

## 2017-11-29 RX ADMIN — SUCRALFATE 1 G: 1 TABLET ORAL at 06:11

## 2017-11-29 NOTE — DISCHARGE SUMMARY
"[facility]  Interventional Cardiology  Discharge Summary      Patient Name: Sangeetha Morales  MRN: [id number]  Admission Date: 11/28/2017  Hospital Length of Stay: 1 days  Discharge Date and Time:  11/29/2017 1:25 PM  Attending Physician: Carroll Morales MD  Discharging Provider: Bisi Holbrook NP  Primary Care Physician: Davonte Fonseca MD    HPI:  Sangeetha Morales is a 89 y.o. female referred by Dr. Heller for evaluation of severe AS (NYHA Class III sx). She has a PMH of HTN, HLD, AF and SSS s/p PPM 11/2016. She was told she had Aortic Stenosis this year. 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 During work-up "they cauterized my stomach" and is also s/p colectomy due to polyps. The patient reports SOB with both exertion and with talking worsening since Jan 2017. She denies PND and orthopnea. She reports LE swelling since 1/2017 for which she takes Lasix daily, increased from twice weekly. She denies chest pain, syncope, palpitations. She uses a walker and gets SOB when walking [id number]. She continues to cook and perform ADLs for herself. She ambulates ~20 minutes each day in preparation for TAVR.      she has undergone the following TAVR work-up:   · ECHO (Date 10/4/17): JAZZMINE= 0.67 cm2, MG= 58 mmHg, [address]= 5.0 m/s, EF= 65-70%.   · LHC (Date 9/6/17): LM calcified (normal), LAD non-obstructive (mLAD 25% just before large diagonal branch), pLCx 25% stenosis, RCA non-obstructive (right dominant)  · STS: 5.7%   · Frailty: 2/4   · Iliacs are >7.94 mm on L and > 8.02 mm on R   · LVOT area by CTA is 4.97 cm2 and Avg Diameter is 25.2 mm (distance 28.6 mm x 21.3 mm) per Dr Cleary  · Other CT findings: 1.0 cm sub-solid pulmonary nodule in the left upper lobe. Finding is nonspecific and could represent infection, inflammation, with neoplasm a possibility. PCP, Dr. Jeromy James, coordinated pelvic ultrasound and will schedule 6 mo f/u CT for further assessment of pulm " nodule.  · Full CODE STATUS  · EKG findings: 12/2016: 1st degree HB; bifascicular block- PPM in situ  · PFTs: Mild: FEV1 100% predicted, FVC 94% predicted, MVV 82% predicted, DLCO 124% predicted  · 6MWT: 636 ft  · High risk: age & frailty per Dr. Head and Dr. Ramírez on 10/4/17.     She is a 29 mm EvolutR candidate via R TF access.    Procedure(s) (LRB):  REPLACEMENT-VALVE-AORTIC (N/A)     Indwelling Lines/Drains at time of discharge:  Lines/Drains/Airways          No matching active lines, drains, or airways          Hospital Course:  Ms. Morales was admitted and underwent successful placement of a 29mm Evolut TAVR via RTF access. There were no complications and she was taken to the CCU in stable condition. TVP was removed as patient has PPM. She remained stable overnight. The following morning, she required diuresis for acute on chronic diastolic heart failure. That morning, she ambulated in the soliz without difficulty. She was eager to go home. It was felt she was stable for discharge.           Pending Diagnostic Studies:     None        * Nodular calcific aortic valve stenosis    S/p 29mm Evolut TAVR via RTF access. Will be on ASA alone s/t hx of GIB         Acute on chronic systolic heart failure    Clinically volume overloaded. Diuresis with Lasix IV x1         Hypothyroidism    Stable, on synthroid         SSS (sick sinus syndrome)    S/p PPM        Essential hypertension    Controlled on amlodipine, enalapril         Lower GI bleed    Resolved, todays H&H11.3/32.6        Paroxysmal atrial fibrillation    On Amiodarone, no AC (GIB when on eliquis)        Colon polyps    S/p partial colectomy             Discharged Condition: stable    Follow Up:  F/u in 1 mo     Patient Instructions:   No discharge procedures on file.  Medications:  Reconciled Home Medications:   Current Discharge Medication List      CONTINUE these medications which have NOT CHANGED    Details   amiodarone (PACERONE) 200 MG Tab Take 1 mg by  mouth every evening.       amlodipine (NORVASC) 10 MG tablet Take 10 mg by mouth once daily.      aspirin (ECOTRIN) 81 MG EC tablet Take 1 tablet (81 mg total) by mouth once daily.  Refills: 0      atorvastatin (LIPITOR) 20 MG tablet Take 20 mg by mouth every evening.       enalapril (VASOTEC) 20 MG tablet Take 20 mg by mouth every evening.       ferrous gluconate (FERGON) 324 MG tablet Take 1 tablet (324 mg total) by mouth 3 (three) times daily with meals.      fluoxetine (PROZAC) 20 MG capsule Take 20 mg by mouth every evening.   Refills: 3      folic acid (FOLVITE) 1 MG tablet Take 1 mg by mouth once daily.      gabapentin (NEURONTIN) 400 MG capsule Take 400 mg by mouth 2 (two) times daily.      levothyroxine (SYNTHROID) 25 MCG tablet Take 1 tablet (25 mcg total) by mouth before breakfast.  Qty: 30 tablet, Refills: 0      pantoprazole (PROTONIX) 40 MG tablet Take 1 tablet (40 mg total) by mouth once daily.  Qty: 30 tablet, Refills: 0      polyethylene glycol (GLYCOLAX) 17 gram PwPk Take 17 g by mouth once daily.  Refills: 0      sucralfate (CARAFATE) 1 gram tablet 4 (four) times daily before meals and nightly.   Refills: 6      acetaminophen (TYLENOL) 325 MG tablet Take 325 mg by mouth every 6 (six) hours as needed for Pain.      furosemide (LASIX) 20 MG tablet Take 20 mg by mouth twice a week. Wed and sat      ketoconazole (NIZORAL) 2 % shampoo APPLY to scalp 2-3 times per week  Refills: 4             Bisi Holbrook NP  Interventional Cardiology  [facility]

## 2017-11-29 NOTE — PT/OT/SLP EVAL
Physical Therapy  Evaluation/Discharge    Sangeetha Morales   MRN: [id number]   Admitting Diagnosis: Nodular calcific aortic valve stenosis    PT Received On: 11/29/17  PT Start Time: 1105     PT Stop Time: 1115    PT Total Time (min): 10 min       Billable Minutes:  Evaluation 10    Diagnosis: Nodular calcific aortic valve stenosis  S/p 11/28 TAVR    Past Medical History:   Diagnosis Date    Afib     Anemia 10/2016    Anticoagulant long-term use     eliquis    Arthritis     Cardiomegaly     Colon polyps 10/2016    Hyperlipidemia     Hypertension       Past Surgical History:   Procedure Laterality Date    CARDIOVERSION  12/2014    CARPAL TUNNEL RELEASE Bilateral     CATARACT EXTRACTION Bilateral     HYSTERECTOMY      bso    JOINT REPLACEMENT      RIGHT COLECTOMY  10/06/2016    TOTAL KNEE ARTHROPLASTY Right        Referring physician: Cindi Rae  Date referred to PT: 11/28/2017    General Precautions: Standard, fall  Orthopedic Precautions: N/A   Braces: N/A       Do you have any cultural, spiritual, Yazidism conflicts, given your current situation?: none reported    Patient History:  Lives With: alone  Living Arrangements: house  Living Environment Comment: Pt reported living alone in [facility] with a son that stays over some nights. PTA, pt mod indep with ambulation (rollator) and indep with ADL's. Family lives close by and is able to assist at home if needed.   Equipment Currently Used at Home: rollator, wheelchair, 3-in-1 commode, shower chair  DME owned (not currently used): none    Previous Level of Function:  Ambulation Skills: needs device  Transfer Skills: needs device    Subjective:  Communicated with RN prior to session and daughter present in room. Pt agreeable to session.    Chief Complaint: none reported   Patient goals: to return home     Pain/Comfort  Pain Rating 1: 0/10  Pain Rating Post-Intervention 1: 0/10      Objective:   Patient found with: telemetry, pulse ox (continuous), blood pressure  cuff     Cognitive Exam:  Oriented to: Person, Place, Time and Situation    Follows Commands/attention: Follows multistep  commands  Communication: clear/fluent  Safety awareness/insight to disability: intact    Physical Exam:  Sensation:   Intact; B LE light touch     Upper Extremity Range of Motion:  Right Upper Extremity: WFL  Left Upper Extremity: WFL    Upper Extremity Strength:  Right Upper Extremity: WFL  Left Upper Extremity: WFL    Lower Extremity Range of Motion:  Right Lower Extremity: WFL  Left Lower Extremity: WFL    Lower Extremity Strength:  Right Lower Extremity: WFL  Left Lower Extremity: WFL     Gross motor coordination: WFL; B LE alternating toe taps     Functional Mobility:  Bed Mobility:  Rolling/Turning to Left:  (N/T 2nd to pt found in bathroom brushing her teeth)    Transfers:  Sit <> Stand Assistance: Modified Independent  Sit <> Stand Assistive Device: Rolling Walker    Gait:   Gait Distance: ~300 ft with rollator with mod indep; No LOB with horizontal head turns or change of speed; pt with slight SOB   Assistance 1: Modified Independent  Gait Assistive Device: Rollator  Gait Pattern: swing-through gait  Gait Deviation(s): decreased donaldo        Balance:   Static Sit: GOOD+: Takes MAXIMAL challenges from all directions.    Dynamic Sit: GOOD+: Maintains balance through MAXIMAL excursions of active trunk motion  Static Stand: GOOD+: Takes MAXIMAL challenges from all directions  Dynamic stand: GOOD+: Independent gait (with or without assistive device)    Therapeutic Activities and Exercises:  Educated pt on PT role/POC  Educated pt on importance of OOB activity and daily ambulation   Pt verbalized understanding     AM-PAC 6 CLICK MOBILITY  How much help from another person does this patient currently need?   1 = Unable, Total/Dependent Assistance  2 = A lot, Maximum/Moderate Assistance  3 = A little, Minimum/Contact Guard/Supervision  4 = None, Modified [place]/Independent    Turning  over in bed (including adjusting bedclothes, sheets and blankets)?: 4  Sitting down on and standing up from a chair with arms (e.g., wheelchair, bedside commode, etc.): 4  Moving from lying on back to sitting on the side of the bed?: 4  Moving to and from a bed to a chair (including a wheelchair)?: 4  Need to walk in hospital room?: 4  Climbing 3-5 steps with a railing?: 3  Total Score: 23     AM-PAC Raw Score CMS G-Code Modifier Level of Impairment Assistance   6 % Total / Unable   7 - 9 CM 80 - 100% Maximal Assist   10 - 14 CL 60 - 80% Moderate Assist   15 - 19 CK 40 - 60% Moderate Assist   20 - 22 CJ 20 - 40% Minimal Assist   23 CI 1-20% SBA / CGA   24 CH 0% Independent/ Mod I     Patient left up in chair with all lines intact, call button in reach and RN notified.    Assessment:   Sangeetha Morales is a 89 y.o. female with a medical diagnosis of Nodular calcific aortic valve stenosis and presents with impaired endurance. PT evaluation complete and no goals established. Pt is functioning at high level and does not required acute care physical therapy services. Education provided to ambulate in hallway 3x/day with RN in order to maintain strength and endurance. Pt verbalized understanding. Please re-consult if functional mobility changes. Anticipate d/c to home with home health.         Rehab identified problem list/impairments: Rehab identified problem list/impairments: impaired endurance    Rehab potential is good.    Activity tolerance: Good    Discharge recommendations: Discharge Facility/Level Of Care Needs: home with home health     Barriers to discharge: Barriers to Discharge: Decreased caregiver support (pt lives alone)    Equipment recommendations: Equipment Needed After Discharge: none (pt owns required equiptment )     GOALS:    Physical Therapy Goals     Not on file          Multidisciplinary Problems (Resolved)        Problem: Physical Therapy Goal    Goal Priority Disciplines Outcome Goal  Variances Interventions   Physical Therapy Goal   (Resolved)     PT/OT, PT Outcome(s) achieved                     PLAN:    D/C acute PT 2nd to pt safe with functional mobility    Plan of Care reviewed with: patient, daughter          Hyacinth OLIVARES Love, PT  11/29/2017

## 2017-11-29 NOTE — PLAN OF CARE
Problem: Infection, Risk/Actual (Adult)  Intervention: Prevent Infection/Maximize Resistance  Pt admittted. Family at bedside. Pt tolerated walk. See assessment and flowsheets. Pt education material handed out to family for reference.

## 2017-11-29 NOTE — PROGRESS NOTES
[facility]  Interventional Cardiology  Progress Note    Patient Name: Sangeetha Morales  MRN: [id number]  Admission Date: 11/28/2017  Hospital Length of Stay: 1 days  Code Status: Full Code   Attending Physician: Carroll Morales MD   Primary Care Physician: Davonte Fonseca MD  Principal Problem:<principal problem not specified>    Subjective:     Interval History:   Patient did well s/p TAVR. No acute events overnight. Pain adequately controlled. Tolerating PO diet                 Objective:     Vital Signs (Most Recent):  Temp: 98.8 °F (37.1 °C) (11/29/17 0705)  Pulse: 66 (11/29/17 0900)  Resp: 17 (11/29/17 0900)  BP: 129/61 (11/29/17 0900)  SpO2: 99 % (11/29/17 0900) Vital Signs (24h Range):  Temp:  [97.1 °F (36.2 °C)-98.8 °F (37.1 °C)] 98.8 °F (37.1 °C)  Pulse:  [60-90] 66  Resp:  [7-38] 17  SpO2:  [93 %-100 %] 99 %  BP: ()/(48-78) 129/61  Arterial Line BP: ()/(20-65) 144/48     Weight: 79.8 kg (176 lb)  Body mass index is 33.25 kg/m².    SpO2: 99 %  O2 Device (Oxygen Therapy): room air      Intake/Output Summary (Last 24 hours) at 11/29/17 0919  Last data filed at 11/29/17 0900   Gross per 24 hour   Intake          1338.75 ml   Output             1700 ml   Net          -361.25 ml       Lines/Drains/Airways     Peripheral Intravenous Line                 Peripheral IV - Single Lumen 11/28/17 0711 Left Hand 1 day                Physical Exam   Constitutional: She is oriented to person, place, and time. She appears well-developed and well-nourished. No distress.   HENT:   Head: Normocephalic and atraumatic.   Mouth/Throat: Oropharynx is clear and moist.   Eyes: Conjunctivae and EOM are normal. Pupils are equal, round, and reactive to light. No scleral icterus.   Neck: Neck supple. No JVD present. No tracheal deviation present.   Cardiovascular: Normal rate and regular rhythm.  Exam reveals no gallop and no friction rub.    Murmur heard.  Pulmonary/Chest: Effort normal. No respiratory  distress. She has no wheezes. She has rales. She exhibits no tenderness.   Abdominal: Soft. Bowel sounds are normal. She exhibits no distension. There is no hepatosplenomegaly. There is no tenderness.   Musculoskeletal: She exhibits no edema or tenderness.   Neurological: She is alert and oriented to person, place, and time.   Skin: Skin is warm and dry. No rash noted. No erythema.   Psychiatric: She has a normal mood and affect. Her behavior is normal.       Significant Labs:   BMP:   Recent Labs  Lab 11/27/17  1210 11/29/17  0532   * 97    133*   K 4.0 3.9    103   CO2 26 24   BUN 16 11   CREATININE 1.0 0.8   CALCIUM 9.4 8.2*   , CMP   Recent Labs  Lab 11/27/17  1210 11/29/17  0532    133*   K 4.0 3.9    103   CO2 26 24   * 97   BUN 16 11   CREATININE 1.0 0.8   CALCIUM 9.4 8.2*   PROT  --  5.5*   ALBUMIN 3.8 3.1*   BILITOT  --  0.9   ALKPHOS  --  45*   AST  --  24   ALT  --  12   ANIONGAP 8 6*   ESTGFRAFRICA 57.7* >60.0   EGFRNONAA 50.1* >60.0    and CBC   Recent Labs  Lab 11/27/17  1210  11/29/17  0532   WBC 9.89  --  9.52   HGB 13.6  --  11.3*   HCT 41.1  < > 32.6*     --  186   < > = values in this interval not displayed.      Assessment and Plan:     Patient is a 89 y.o. female presenting with:    Acute on chronic systolic heart failure    Clinically volume overloaded. Diuresis with Lasix IV x1         Nodular calcific aortic valve stenosis    S/p 29mm Evolut TAVR via RTF access. Will be on ASA alone s/t hx of GIB         Hypothyroidism    Stable, on synthroid         SSS (sick sinus syndrome)    S/p PPM        Essential hypertension    Controlled on amlodipine, enalapril         Lower GI bleed    Resolved, todays H&H11.3/32.6        Paroxysmal atrial fibrillation    On Amiodarone, no AC (GIB when on eliquis)        Colon polyps    S/p partial colectomy             VTE Risk Mitigation     None          Bisi Holbrook, SKIP  Interventional Cardiology  [facility]  [place]

## 2017-11-29 NOTE — HOSPITAL COURSE
Ms. Morales was admitted and underwent successful placement of a 29mm Evolut TAVR via RTF access. There were no complications and she was taken to the CCU in stable condition. TVP was removed as patient has PPM. She remained stable overnight. The following morning, she required diuresis for acute on chronic diastolic heart failure. That morning, she ambulated in the soliz without difficulty. She was eager to go home. It was felt she was stable for discharge.

## 2017-11-29 NOTE — PLAN OF CARE
11/29/17 1106   Discharge Assessment   Assessment Type Discharge Planning Assessment   Confirmed/corrected address and phone number on facesheet? Yes   Assessment information obtained from? Medical Record   Expected Length of Stay (days) 1   Communicated expected length of stay with patient/caregiver yes   Prior to hospitilization cognitive status: Alert/Oriented   Prior to hospitalization functional status: Needs Assistance;Assistive Equipment   Current cognitive status: Alert/Oriented   Current Functional Status: Assistive Equipment;Needs Assistance   Facility Arrived From: home for planned TAVR   Lives With facility resident   Able to Return to Prior Arrangements yes   Is patient able to care for self after discharge? Yes   Who are your caregiver(s) and their phone number(s)? Radha Encinas [phone]   Patient's perception of discharge disposition other (comments)  ([facility])   Readmission Within The Last 30 Days no previous admission in last 30 days   Patient currently being followed by outpatient case management? No   Patient currently receives any other outside agency services? No   Equipment Currently Used at Home 3-in-1 commode;rollator;shower chair;wheelchair   Do you have any problems affording any of your prescribed medications? No   Is the patient taking medications as prescribed? yes   Does the patient have transportation home? Yes   Transportation Available car;family or friend will provide;van, wheelchair accessible   Does the patient receive services at the [facility]? No   Discharge Plan A Return to nursing home   Patient/Family In Agreement With Plan yes

## 2017-11-29 NOTE — HPI
"Sangeetha Morales is a 89 y.o. female referred by Dr. Heller for evaluation of severe AS (NYHA Class III sx). She has a PMH of HTN, HLD, AF and SSS s/p PPM 11/2016. She was told she had Aortic Stenosis this year. 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 During work-up "they cauterized my stomach" and is also s/p colectomy due to polyps. The patient reports SOB with both exertion and with talking worsening since Jan 2017. She denies PND and orthopnea. She reports LE swelling since 1/2017 for which she takes Lasix daily, increased from twice weekly. She denies chest pain, syncope, palpitations. She uses a walker and gets SOB when walking [id number]. She continues to cook and perform ADLs for herself. She ambulates ~20 minutes each day in preparation for TAVR.      she has undergone the following TAVR work-up:   · ECHO (Date 10/4/17): JAZZMINE= 0.67 cm2, MG= 58 mmHg, [address]= 5.0 m/s, EF= 65-70%.   · LHC (Date 9/6/17): LM calcified (normal), LAD non-obstructive (mLAD 25% just before large diagonal branch), pLCx 25% stenosis, RCA non-obstructive (right dominant)  · STS: 5.7%   · Frailty: 2/4   · Iliacs are >7.94 mm on L and > 8.02 mm on R   · LVOT area by CTA is 4.97 cm2 and Avg Diameter is 25.2 mm (distance 28.6 mm x 21.3 mm) per Dr Cleary  · Other CT findings: 1.0 cm sub-solid pulmonary nodule in the left upper lobe. Finding is nonspecific and could represent infection, inflammation, with neoplasm a possibility. PCP, Dr. Jeromy James, coordinated pelvic ultrasound and will schedule 6 mo f/u CT for further assessment of pulm nodule.  · Full CODE STATUS  · EKG findings: 12/2016: 1st degree HB; bifascicular block- PPM in situ  · PFTs: Mild: FEV1 100% predicted, FVC 94% predicted, MVV 82% predicted, DLCO 124% predicted  · 6MWT: 636 ft  · High risk: age & frailty per Dr. Head and Dr. Ramírez on 10/4/17.     She is a 29 mm EvolutR candidate via R TF access.  "

## 2017-11-29 NOTE — PLAN OF CARE
Problem: Patient Care Overview  Goal: Plan of Care Review  Outcome: Ongoing (interventions implemented as appropriate)  No acute events throughout day. See vital signs and assessments in flowsheets. See below for updates on today's progress.     Pulmonary: 100% room air, no c/o SOB    Cardiovascular: 100 AV paced, rate set at 60 bpm, -120    Neurological: AAOx4, up with standby assist x 1    Gastrointestinal: tolerating cardiac diet well    Genitourinary: voids spontaneously with no difficulty    Integumentary/Other: R & L femoral dressings C/D/I      Patient progressing towards goals as tolerated, plan of care communicated and reviewed with Sangeetha Morales and family. All concerns addressed. Will continue to monitor.

## 2017-11-29 NOTE — SUBJECTIVE & OBJECTIVE
Interval History:   Patient did well s/p TAVR. No acute events overnight. Pain adequately controlled. Tolerating PO diet                 Objective:     Vital Signs (Most Recent):  Temp: 98.8 °F (37.1 °C) (11/29/17 0705)  Pulse: 66 (11/29/17 0900)  Resp: 17 (11/29/17 0900)  BP: 129/61 (11/29/17 0900)  SpO2: 99 % (11/29/17 0900) Vital Signs (24h Range):  Temp:  [97.1 °F (36.2 °C)-98.8 °F (37.1 °C)] 98.8 °F (37.1 °C)  Pulse:  [60-90] 66  Resp:  [7-38] 17  SpO2:  [93 %-100 %] 99 %  BP: ()/(48-78) 129/61  Arterial Line BP: ()/(20-65) 144/48     Weight: 79.8 kg (176 lb)  Body mass index is 33.25 kg/m².    SpO2: 99 %  O2 Device (Oxygen Therapy): room air      Intake/Output Summary (Last 24 hours) at 11/29/17 0919  Last data filed at 11/29/17 0900   Gross per 24 hour   Intake          1338.75 ml   Output             1700 ml   Net          -361.25 ml       Lines/Drains/Airways     Peripheral Intravenous Line                 Peripheral IV - Single Lumen 11/28/17 0711 Left Hand 1 day                Physical Exam   Constitutional: She is oriented to person, place, and time. She appears well-developed and well-nourished. No distress.   HENT:   Head: Normocephalic and atraumatic.   Mouth/Throat: Oropharynx is clear and moist.   Eyes: Conjunctivae and EOM are normal. Pupils are equal, round, and reactive to light. No scleral icterus.   Neck: Neck supple. No JVD present. No tracheal deviation present.   Cardiovascular: Normal rate and regular rhythm.  Exam reveals no gallop and no friction rub.    Murmur heard.  Pulmonary/Chest: Effort normal. No respiratory distress. She has no wheezes. She has rales. She exhibits no tenderness.   Abdominal: Soft. Bowel sounds are normal. She exhibits no distension. There is no hepatosplenomegaly. There is no tenderness.   Musculoskeletal: She exhibits no edema or tenderness.   Neurological: She is alert and oriented to person, place, and time.   Skin: Skin is warm and dry. No rash  noted. No erythema.   Psychiatric: She has a normal mood and affect. Her behavior is normal.       Significant Labs:   BMP:   Recent Labs  Lab 11/27/17  1210 11/29/17  0532   * 97    133*   K 4.0 3.9    103   CO2 26 24   BUN 16 11   CREATININE 1.0 0.8   CALCIUM 9.4 8.2*   , CMP   Recent Labs  Lab 11/27/17  1210 11/29/17  0532    133*   K 4.0 3.9    103   CO2 26 24   * 97   BUN 16 11   CREATININE 1.0 0.8   CALCIUM 9.4 8.2*   PROT  --  5.5*   ALBUMIN 3.8 3.1*   BILITOT  --  0.9   ALKPHOS  --  45*   AST  --  24   ALT  --  12   ANIONGAP 8 6*   ESTGFRAFRICA 57.7* >60.0   EGFRNONAA 50.1* >60.0    and CBC   Recent Labs  Lab 11/27/17  1210  11/29/17  0532   WBC 9.89  --  9.52   HGB 13.6  --  11.3*   HCT 41.1  < > 32.6*     --  186   < > = values in this interval not displayed.

## 2017-11-29 NOTE — NURSING
Pt educated and received discharge papers. IV d/c'd and future appointments discussed with pt and daughter. Pt to be walked out in wheelchair and transported home by daughter.

## 2017-11-29 NOTE — DISCHARGE INSTRUCTIONS
Weigh yourself on the same scale every morning. If you gain more than 3 lbs in 1 day or more than 5 lbs in 1 week, or if you experience worsening shortness of breath, swelling in your feet or legs, or difficulty laying flat, take an extra Lasix pill until you reach your normal weight or your symptoms have resolved. If you have any questions, you can call our office at [phone].     You will need to take a single dose of antibiotics prior to certain dental procedures, colonoscopies, or bladder procedures. We can call this prescription in for you or the physician performing the procedure can call it in for you. If you have questions about whether or not a procedure will require antibiotics, you can call our office. Always let your physician know that you have an artificial heart valve.

## 2017-11-29 NOTE — PLAN OF CARE
Problem: Physical Therapy Goal  Goal: Physical Therapy Goal  Outcome: Outcome(s) achieved Date Met: 11/29/17  Eval completed. Pt safe with functional mobility.

## 2017-11-30 NOTE — OP NOTE
DATE OF PROCEDURE:  11/28/2017     PREOPERATIVE DIAGNOSIS:  Severe aortic stenosis     POSTOPERATIVE DIAGNOSIS:  Severe aortic stenosis.     PROCEDURE:  right transfemoral transcatheter aortic valve replacement with a 29   mm evolut valve.     SURGEON:  Ronald Head M.D.     CO-SURGEON:  Everardo Cleary M.D.     ANESTHESIA:  Moderate sedation and local anesthetic.     INDICATIONS FOR PROCEDURE:  A 89 yo patient high risk for surgical AVR.  He was evaluated for transcatheter valve by the valve team and found to be an acceptable transfemoral candidate. The patient understood the risks of the procedure and was able to give informed consent.     OPERATIVE FINDINGS:  Severe aortic stenosis, severely calcified valve,   well-seated prosthesis post-implant with no paravalvular leak.     BLOOD LOSS:  100 mL     PROCEDURE IN DETAIL:  The patient was taken electively to the Cath Lab, placed   supine upon the table.  Moderate sedation and local anesthetic was used. The patient was prepped and draped.  Both femoral arteries were accessed with a micropuncture technique.  We performed fluoroscopy, heparinized the patient, upsized to delivery sheaths. Catheters were positioned in the root of the aorta and we obtained our implant angle. We crossed the aortic valve with a soft wire and exchanged this for a stiff wire over the catheter.     We then performed balloon valvuloplasty under rapid pacing. The patient tolerated this well hemodynamically.     The valve was brought in the field sterilely.  We crossed the aortic arch with carotid occlusion, positioned at the aortic valve annulus and delivered it under rapid pacing.  The valve was well seated with no paravalvular leak.  Cannulas were removed.  Groins were closed percutaneously.      Excellent hemostasis was achieved after administration of protamine. Dr Cleary and ABDIRASHID performed the procedure together as cosurgeons and were present for the procedure.

## 2017-12-01 NOTE — ANESTHESIA POSTPROCEDURE EVALUATION
"Anesthesia Post Evaluation    Patient: Sangeetha Morales    Procedure(s) Performed: Procedure(s) (LRB):  REPLACEMENT-VALVE-AORTIC (N/A)    Final Anesthesia Type: general  Patient location during evaluation: Cath Lab  Patient participation: Yes- Able to Participate  Level of consciousness: awake and alert  Post-procedure vital signs: reviewed and stable  Pain management: adequate  Airway patency: patent  PONV status at discharge: No PONV  Anesthetic complications: no      Cardiovascular status: blood pressure returned to baseline  Respiratory status: unassisted, spontaneous ventilation and room air  Hydration status: euvolemic          Visit Vitals  /62   Pulse 63   Temp 37.2 °C (98.9 °F) (Oral)   Resp (!) 30   Ht 5' 1" (1.549 m)   Wt 79.8 kg (176 lb)   LMP  (LMP Unknown)   SpO2 100%   Breastfeeding? No   BMI 33.25 kg/m²       Pain/Lorin Score: No Data Recorded      "

## 2017-12-02 ENCOUNTER — HOSPITAL ENCOUNTER (EMERGENCY)
Facility: HOSPITAL | Age: 82
Discharge: HOME | End: 2017-12-02
Attending: EMERGENCY MEDICINE
Payer: MEDICARE

## 2017-12-02 VITALS
RESPIRATION RATE: 19 BRPM | WEIGHT: 170 LBS | SYSTOLIC BLOOD PRESSURE: 146 MMHG | TEMPERATURE: 99 F | DIASTOLIC BLOOD PRESSURE: 66 MMHG | HEIGHT: 61 IN | HEART RATE: 60 BPM | OXYGEN SATURATION: 97 % | BODY MASS INDEX: 32.1 KG/M2

## 2017-12-02 DIAGNOSIS — R05.9 COUGH: ICD-10-CM

## 2017-12-02 DIAGNOSIS — R09.82 POSTNASAL DRIP: ICD-10-CM

## 2017-12-02 DIAGNOSIS — J40 BRONCHITIS: Primary | ICD-10-CM

## 2017-12-02 LAB
ALBUMIN SERPL BCP-MCNC: 3.5 G/DL
ALP SERPL-CCNC: 56 U/L
ALT SERPL W/O P-5'-P-CCNC: 20 U/L
ANION GAP SERPL CALC-SCNC: 9 MMOL/L
AST SERPL-CCNC: 35 U/L
BASOPHILS # BLD AUTO: 0.04 K/UL
BASOPHILS NFR BLD: 0.4 %
BILIRUB SERPL-MCNC: 1.1 MG/DL
BNP SERPL-MCNC: 213 PG/ML
BUN SERPL-MCNC: 9 MG/DL
CALCIUM SERPL-MCNC: 9.1 MG/DL
CHLORIDE SERPL-SCNC: 104 MMOL/L
CO2 SERPL-SCNC: 25 MMOL/L
CREAT SERPL-MCNC: 0.8 MG/DL
DIFFERENTIAL METHOD: ABNORMAL
EOSINOPHIL # BLD AUTO: 0.2 K/UL
EOSINOPHIL NFR BLD: 1.9 %
ERYTHROCYTE [DISTWIDTH] IN BLOOD BY AUTOMATED COUNT: 13.9 %
EST. GFR  (AFRICAN AMERICAN): >60 ML/MIN/1.73 M^2
EST. GFR  (NON AFRICAN AMERICAN): >60 ML/MIN/1.73 M^2
GLUCOSE SERPL-MCNC: 102 MG/DL
HCT VFR BLD AUTO: 37.9 %
HGB BLD-MCNC: 12.7 G/DL
IMM GRANULOCYTES # BLD AUTO: 0.04 K/UL
IMM GRANULOCYTES NFR BLD AUTO: 0.4 %
INR PPP: 0.9
LACTATE SERPL-SCNC: 0.6 MMOL/L
LYMPHOCYTES # BLD AUTO: 1.1 K/UL
LYMPHOCYTES NFR BLD: 10.9 %
MCH RBC QN AUTO: 31 PG
MCHC RBC AUTO-ENTMCNC: 33.5 G/DL
MCV RBC AUTO: 92 FL
MONOCYTES # BLD AUTO: 1 K/UL
MONOCYTES NFR BLD: 10.8 %
NEUTROPHILS # BLD AUTO: 7.3 K/UL
NEUTROPHILS NFR BLD: 75.6 %
NRBC BLD-RTO: 0 /100 WBC
PLATELET # BLD AUTO: 245 K/UL
PMV BLD AUTO: 10.2 FL
POTASSIUM SERPL-SCNC: 3.6 MMOL/L
PROT SERPL-MCNC: 7 G/DL
PROTHROMBIN TIME: 9.9 SEC
RBC # BLD AUTO: 4.1 M/UL
SODIUM SERPL-SCNC: 138 MMOL/L
TROPONIN I SERPL DL<=0.01 NG/ML-MCNC: 0.07 NG/ML
WBC # BLD AUTO: 9.64 K/UL

## 2017-12-02 PROCEDURE — 99284 EMERGENCY DEPT VISIT MOD MDM: CPT | Mod: 25

## 2017-12-02 PROCEDURE — 85610 PROTHROMBIN TIME: CPT

## 2017-12-02 PROCEDURE — 83605 ASSAY OF LACTIC ACID: CPT

## 2017-12-02 PROCEDURE — 83880 ASSAY OF NATRIURETIC PEPTIDE: CPT

## 2017-12-02 PROCEDURE — 99283 EMERGENCY DEPT VISIT LOW MDM: CPT | Mod: GC,,, | Performed by: EMERGENCY MEDICINE

## 2017-12-02 PROCEDURE — 93010 ELECTROCARDIOGRAM REPORT: CPT | Mod: ,,, | Performed by: INTERNAL MEDICINE

## 2017-12-02 PROCEDURE — 80053 COMPREHEN METABOLIC PANEL: CPT

## 2017-12-02 PROCEDURE — 85025 COMPLETE CBC W/AUTO DIFF WBC: CPT

## 2017-12-02 PROCEDURE — 93005 ELECTROCARDIOGRAM TRACING: CPT

## 2017-12-02 PROCEDURE — 84484 ASSAY OF TROPONIN QUANT: CPT

## 2017-12-02 RX ORDER — MOXIFLOXACIN HYDROCHLORIDE 400 MG/1
400 TABLET ORAL ONCE
Status: DISCONTINUED | OUTPATIENT
Start: 2017-12-02 | End: 2017-12-02

## 2017-12-02 RX ORDER — BENZONATATE 100 MG/1
100 CAPSULE ORAL 3 TIMES DAILY PRN
Qty: 20 CAPSULE | Refills: 0 | Status: SHIPPED | OUTPATIENT
Start: 2017-12-02 | End: 2017-12-12

## 2017-12-02 RX ORDER — DOXYCYCLINE 100 MG/1
100 CAPSULE ORAL EVERY 12 HOURS
Qty: 14 CAPSULE | Refills: 0 | Status: SHIPPED | OUTPATIENT
Start: 2017-12-02 | End: 2017-12-09

## 2017-12-02 NOTE — PROVIDER PROGRESS NOTES - EMERGENCY DEPT.
Encounter Date: 12/2/2017    ED Physician Progress Notes           Paced rhythm    I, Ines Gonzales, am scribing for, and in the presence of, Dr. Farmer. I performed the above scribed service and the documentation accurately describes the services I performed. I attest to the accuracy of the note.

## 2017-12-02 NOTE — ED TRIAGE NOTES
Sangeetha Morales, a 89 y.o. female presents to the ED c/o cough post valve replacement surgery on 11/28/17.  Pt also reports SOB but denies CP at this time.     Chief Complaint   Patient presents with    aortic valve replaced 4 days ago,  has a cough     referred to ER for cough post aortic valve replacement 4 days ago, denies chest pain .denies edema.      Review of patient's allergies indicates:  No Known Allergies  Past Medical History:   Diagnosis Date    Afib     Anemia 10/2016    Anticoagulant long-term use     eliquis    Arthritis     Cardiomegaly     Colon polyps 10/2016    Hyperlipidemia     Hypertension      LOC: Patient name and date of birth verified. The patient is awake, alert and aware of environment with an appropriate affect, the patient is oriented x 3 and speaking appropriately.   APPEARANCE: Patient resting comfortably, patient is clean and well groomed, patient's clothing is properly fastened.  SKIN: The skin is warm and dry, color consistent with ethnicity, patient has normal skin turgor and moist mucus membranes, skin intact, no breakdown or bruising noted.  MUSCULOSKELETAL: Patient moving all extremities well, no obvious swelling or deformities noted.   RESPIRATORY: Respirations are spontaneous, patient has a normal effort and rate, no accessory muscle use noted. SOB reported at this time.  CARDIAC: Patient has a normal rate and rhythm, no periphreal edema noted, capillary refill < 3 seconds. Denies CP at this time.   ABDOMEN: Soft and non tender to palpation, no distention noted. Bowel sounds present in all four quadrants. Denies abdominal pain at this time.   NEUROLOGIC: Eyes open spontaneously, behavior appropriate to situation, follows commands, facial expression symmetrical, bilateral hand grasp equal and even, purposeful motor response noted, normal sensation in all extremities when touched with a finger.

## 2017-12-02 NOTE — ED PROVIDER NOTES
Encounter Date: 12/2/2017    SCRIBE #1 NOTE: Reyna MENDENHALL, april scribing for, and in the presence of, Dr. Tyson. the Resident attestation and the EKG reading. Other sections scribed: CXR reading.       History     Chief Complaint   Patient presents with    aortic valve replaced 4 days ago,  has a cough     referred to ER for cough post aortic valve replacement 4 days ago, denies chest pain .denies edema.      89-year-old female with HTN, HLD, A-fib (not on anticoagulation due to GI bleeding), SSS (s/p PPM), and recent TAVR (on 11/28) who presents to the ED with cough for 1 day.  She underwent a TAVR on 11/28 and was discharged on the same night. Since discharge, she had low-grade fever (100.5F). She started having cough with clear sputum last night. She denies chills, SOB, chest pain, palpitation, abdominal pain, N/V, diarrhea, or dysuria.      The history is provided by the patient.     Review of patient's allergies indicates:  No Known Allergies  Past Medical History:   Diagnosis Date    Afib     Anemia 10/2016    Anticoagulant long-term use     eliquis    Arthritis     Cardiomegaly     Colon polyps 10/2016    Hyperlipidemia     Hypertension      Past Surgical History:   Procedure Laterality Date    CARDIOVERSION  12/2014    CARPAL TUNNEL RELEASE Bilateral     CATARACT EXTRACTION Bilateral     HYSTERECTOMY      bso    JOINT REPLACEMENT      RIGHT COLECTOMY  10/06/2016    TOTAL KNEE ARTHROPLASTY Right      Family History   Problem Relation Age of Onset    Early death Mother      pneumonia    Hypertension Father     Cancer Father      face    Cancer Brother      colon    Heart disease Brother      Social History   Substance Use Topics    Smoking status: Never Smoker    Smokeless tobacco: Never Used    Alcohol use No     Review of Systems   Constitutional: Negative for chills and fever.   HENT: Positive for postnasal drip.    Eyes: Negative for photophobia.   Respiratory: Positive for  cough. Negative for shortness of breath.    Cardiovascular: Negative for chest pain and leg swelling.   Gastrointestinal: Negative for abdominal pain, nausea and vomiting.   Genitourinary: Negative for dysuria.   Allergic/Immunologic: Negative for immunocompromised state.   Neurological: Negative for dizziness and headaches.   Psychiatric/Behavioral: Negative for confusion.       Physical Exam     Initial Vitals [12/02/17 1619]   BP Pulse Resp Temp SpO2   (!) 161/77 66 18 98.7 °F (37.1 °C) 98 %      MAP       105         Physical Exam    Vitals reviewed.  Constitutional: She appears well-developed and well-nourished. She is not diaphoretic. No distress.   HENT:   Head: Normocephalic and atraumatic.   Neck: Neck supple. No JVD present.   Cardiovascular: Normal rate, regular rhythm and normal heart sounds.   Pulmonary/Chest: Effort normal. No respiratory distress. She has no wheezes. She has rales in the right lower field.   Abdominal: Soft. Bowel sounds are normal. There is no tenderness.   Musculoskeletal: She exhibits no edema.   Neurological: She is alert and oriented to person, place, and time.   Skin: Skin is warm and dry.         ED Course   Procedures  Labs Reviewed   CBC W/ AUTO DIFFERENTIAL - Abnormal; Notable for the following:        Result Value    Gran% 75.6 (*)     Lymph% 10.9 (*)     All other components within normal limits   COMPREHENSIVE METABOLIC PANEL - Abnormal; Notable for the following:     Total Bilirubin 1.1 (*)     All other components within normal limits   B-TYPE NATRIURETIC PEPTIDE - Abnormal; Notable for the following:      (*)     All other components within normal limits   TROPONIN I - Abnormal; Notable for the following:     Troponin I 0.065 (*)     All other components within normal limits   LACTIC ACID, PLASMA   PROTIME-INR     EKG Readings: (Independently Interpreted)   Paced left axis deviation at 60 bpm.        X-Rays:   Independently Interpreted Readings:   Chest X-Ray:  No santiago evidence of consolidation noted.      Medical Decision Making:   History:   Old Medical Records: I decided to obtain old medical records.  Initial Assessment:   89-year-old female with HTN, HLD, A-fib (not on anticoagulation due to GI bleeding), SSS (s/p PPM), and recent TAVR (on 11/28) who presents to the ED with cough for 1 day and low-grade fever for 4 days. On exam, she has rales on her RLL.  Differential Diagnosis:   Pneumonia, CHF exacerbation, URI  Independently Interpreted Test(s):   I have ordered and independently interpreted X-rays - see prior notes.  I have ordered and independently interpreted EKG Reading(s) - see prior notes  Clinical Tests:   Lab Tests: Ordered and Reviewed  Radiological Study: Ordered and Reviewed  Medical Tests: Ordered and Reviewed  ED Management:  17:00  - CBC, CMP, INR, BNP, troponin, and lactate ordered.  - CXR ordered.    18:30  - Labs are stable.  - CXR showed no consolidation.  - Discussed with cardiology, they will evaluate the patient.    19:00  - Per cardiology, patient can be discharged with a course of doxycycline for bronchitis.      Other:   I have discussed this case with another health care provider.            Scribe Attestation:   Scribe #1: I performed the above scribed service and the documentation accurately describes the services I performed. I attest to the accuracy of the note.    Attending Attestation:   Physician Attestation Statement for Resident:  As the supervising MD   Physician Attestation Statement: I have personally seen and examined this patient.   I agree with the above history. -: 89 y.o. female with recent TAVR presents for evaluation of non productive cough for few days in duration.    As the supervising MD I agree with the above PE.    As the supervising MD I agree with the above treatment, course, plan, and disposition.  I have reviewed and agree with the residents interpretation of the following: EKG, x-rays and lab data.           Physician Attestation for Scribe:      Comments: I, Dr. Rolando Tyson, personally performed the services described in this documentation. All medical record entries made by the scribe were at my direction and in my presence.  I have reviewed the chart and agree that the record reflects my personal performance and is accurate and complete. Rolando Tyson MD.  7:10 AM 12/08/2017              ED Course      Clinical Impression:   The primary encounter diagnosis was Bronchitis. Diagnoses of Cough and Postnasal drip were also pertinent to this visit.    Disposition:   Disposition: Discharged  Condition: Dorota Gonzales MD  Resident  12/02/17 2141       Rolando Tyson MD  12/08/17 0759

## 2017-12-03 NOTE — PROGRESS NOTES
Patient seen and examined in ED. Labs reviewed. BNP mildly elevated but no symptom and sign of fluid overload. CXR with concern of bronchitis. She has chronic sinusitis and post nasal drip symptoms which she reports are unchanged this time. PRN anti tussives recommended. Patient and her family agreed with plan.      Valentin Vital MD  PGY-[id number]  Interventional Cardiolology

## 2017-12-03 NOTE — ED NOTES
The patient is awake, alert and acting age appropriately. Family at bedside.    No apparent distress noted. Airway is open and patent.  Respirations with normal effort and rate noted. Explanation of care provided to family and patient. [place] provided. No needs at this time. Will continue to monitor.

## 2017-12-03 NOTE — DISCHARGE INSTRUCTIONS
Future Appointments  Date Time Provider Department Center   1/3/2018 8:15 AM LAB, [facility]POINT[facility] [place] [facility]   1/3/2018 8:45 AM ECHO, [facility]   1/3/2018 10:00 AM [facility]rivast[facility]       Our goal in the emergency department is to always give you outstanding care and exceptional service. You may receive a survey by mail or e-mail in the next week regarding your experience in our ED. We would greatly appreciate your completing and returning the survey. Your feedback provides us with a way to recognize our staff who give very good care and it helps us learn how to improve when your experience was below our aspiration of excellence.

## 2018-01-10 ENCOUNTER — HOSPITAL ENCOUNTER (OUTPATIENT)
Dept: CARDIOLOGY | Facility: CLINIC | Age: 83
Discharge: HOME | End: 2018-01-10
Attending: INTERNAL MEDICINE
Payer: MEDICARE

## 2018-01-10 ENCOUNTER — OFFICE VISIT (OUTPATIENT)
Dept: CARDIOLOGY | Facility: CLINIC | Age: 83
End: 2018-01-10
Payer: MEDICARE

## 2018-01-10 VITALS
BODY MASS INDEX: 30.1 KG/M2 | DIASTOLIC BLOOD PRESSURE: 74 MMHG | HEIGHT: 62 IN | SYSTOLIC BLOOD PRESSURE: 150 MMHG | WEIGHT: 163.56 LBS | HEART RATE: 60 BPM | OXYGEN SATURATION: 60 %

## 2018-01-10 DIAGNOSIS — I48.0 PAROXYSMAL ATRIAL FIBRILLATION: ICD-10-CM

## 2018-01-10 DIAGNOSIS — I50.32 CHRONIC DIASTOLIC HEART FAILURE: ICD-10-CM

## 2018-01-10 DIAGNOSIS — Z95.0 HX OF CARDIAC PACEMAKER: Chronic | ICD-10-CM

## 2018-01-10 DIAGNOSIS — I35.0 AORTIC VALVE STENOSIS, ETIOLOGY OF CARDIAC VALVE DISEASE UNSPECIFIED: ICD-10-CM

## 2018-01-10 DIAGNOSIS — Z95.2 S/P TAVR (TRANSCATHETER AORTIC VALVE REPLACEMENT): Primary | ICD-10-CM

## 2018-01-10 DIAGNOSIS — E03.9 HYPOTHYROIDISM, UNSPECIFIED TYPE: ICD-10-CM

## 2018-01-10 DIAGNOSIS — I10 ESSENTIAL HYPERTENSION: ICD-10-CM

## 2018-01-10 LAB
AORTIC VALVE REGURGITATION: ABNORMAL
DIASTOLIC DYSFUNCTION: YES
ESTIMATED PA SYSTOLIC PRESSURE: 33.03
RETIRED EF AND QEF - SEE NOTES: 65 (ref 55–65)
TRICUSPID VALVE REGURGITATION: ABNORMAL

## 2018-01-10 PROCEDURE — 99215 OFFICE O/P EST HI 40 MIN: CPT | Mod: PBBFAC

## 2018-01-10 PROCEDURE — 99214 OFFICE O/P EST MOD 30 MIN: CPT | Mod: S$PBB,,, | Performed by: INTERNAL MEDICINE

## 2018-01-10 PROCEDURE — 99999 PR PBB SHADOW E&M-EST. PATIENT-LVL V: CPT | Mod: PBBFAC,,,

## 2018-01-10 PROCEDURE — 93306 TTE W/DOPPLER COMPLETE: CPT | Mod: PBBFAC | Performed by: INTERNAL MEDICINE

## 2018-01-10 RX ORDER — BUSPIRONE HYDROCHLORIDE 15 MG/1
15 TABLET ORAL 2 TIMES DAILY PRN
COMMUNITY
Start: 2017-10-23

## 2018-01-10 NOTE — PROGRESS NOTES
"Subjective:    Patient ID:  Sangeetha Morales is a 89 y.o. female who presents for follow-up of Aortic Stenosis    Referring: Dr. Heller    HPI  Ms. Morales presents today for 1 mon f/u s/p 29mm Evolut TAVR via R TF access on 11/28/17. She has done well since her TAVR and she is without complaints today. Her SINHA and stamina have improved. She has been walking at least 20 minutes most days. She denies CP, PND, orthopnea, or LE edema.     NYHA Class II, CCS Class 0    Review of Systems   Constitution: Negative for chills, diaphoresis, fever, weakness, weight gain and weight loss.   HENT: Negative for sore throat.    Eyes: Negative for blurred vision, vision loss in left eye, vision loss in right eye and visual disturbance.   Cardiovascular: Negative for chest pain, claudication, dyspnea on exertion, leg swelling, near-syncope, orthopnea, palpitations, paroxysmal nocturnal dyspnea and syncope.   Respiratory: Negative for cough, hemoptysis, shortness of breath, sputum production and wheezing.    Endocrine: Negative for cold intolerance and heat intolerance.   Hematologic/Lymphatic: Negative for adenopathy. Does not bruise/bleed easily.   Skin: Negative for rash.   Musculoskeletal: Negative for falls, muscle weakness and myalgias.   Gastrointestinal: Negative for abdominal pain, change in bowel habit, constipation, diarrhea, melena and nausea.   Genitourinary: Negative for bladder incontinence.   Neurological: Negative for dizziness, focal weakness, headaches, light-headedness and numbness.   Psychiatric/Behavioral: Negative for altered mental status.         Vitals:    01/10/18 1032 01/10/18 1034   BP: (!) 146/67 (!) 150/74   BP Location: Left arm Right arm   Patient Position: Sitting Sitting   BP Method: Large (Automatic) Large (Automatic)   Pulse: 60 60   SpO2: 98% (!) 60%   Weight: 74.2 kg (163 lb 9.3 oz) 74.2 kg (163 lb 9.3 oz)   Height: 5' 2" (1.575 m) 5' 2" (1.575 m)   Body mass index is 29.92 kg/m².    Objective:    " Physical Exam   Constitutional: She is oriented to person, place, and time. She appears well-developed and well-nourished. No distress.   HENT:   Head: Normocephalic and atraumatic.   Mouth/Throat: Oropharynx is clear and moist.   Eyes: Conjunctivae and EOM are normal. Pupils are equal, round, and reactive to light. No scleral icterus.   Neck: Neck supple. No JVD present. No tracheal deviation present.   Cardiovascular: Normal rate and regular rhythm.  Exam reveals no gallop and no friction rub.    Murmur heard.  Pulmonary/Chest: Effort normal and breath sounds normal. No respiratory distress. She has no wheezes. She has no rales. She exhibits no tenderness.   Abdominal: Soft. Bowel sounds are normal. She exhibits no distension. There is no hepatosplenomegaly. There is no tenderness.   Musculoskeletal: She exhibits no edema or tenderness.   Neurological: She is alert and oriented to person, place, and time.   Skin: Skin is warm and dry. No rash noted. No erythema.   Psychiatric: She has a normal mood and affect. Her behavior is normal.         Assessment:   Paroxysmal atrial fibrillation  On Amiodarone, no AC (GIB when on eliquis)    Hx of cardiac pacemaker  Dual chamber PPM placed 11/30/16 for SSS.       Essential hypertension  Controlled on amlodipine, enalapril     Chronic diastolic heart failure  Well compensated clinically at this time.   Weights are stable.   On Lasix 20mg prn.     Hypothyroidism  Stable, on synthroid     S/P TAVR (transcatheter aortic valve replacement)  s/p 29mm Evolut TAVR via R TF access on 11/28/17.  Doing well.   On ASA alone due to GIB.     Plan:       Follow up in 1 year (November 2018) with echo.   SBEP for life.   ASA indefinitely.

## 2019-01-08 ENCOUNTER — HOSPITAL ENCOUNTER (OUTPATIENT)
Dept: CARDIOLOGY | Facility: CLINIC | Age: 84
Discharge: HOME | End: 2019-01-08
Attending: PHYSICIAN ASSISTANT
Payer: MEDICARE

## 2019-01-08 ENCOUNTER — OFFICE VISIT (OUTPATIENT)
Dept: CARDIOLOGY | Facility: CLINIC | Age: 84
End: 2019-01-08
Payer: MEDICARE

## 2019-01-08 VITALS
SYSTOLIC BLOOD PRESSURE: 151 MMHG | OXYGEN SATURATION: 97 % | BODY MASS INDEX: 28.11 KG/M2 | HEIGHT: 62 IN | WEIGHT: 152.75 LBS | HEART RATE: 61 BPM | DIASTOLIC BLOOD PRESSURE: 72 MMHG

## 2019-01-08 VITALS
DIASTOLIC BLOOD PRESSURE: 70 MMHG | HEART RATE: 60 BPM | BODY MASS INDEX: 30 KG/M2 | HEIGHT: 62 IN | SYSTOLIC BLOOD PRESSURE: 160 MMHG | WEIGHT: 163 LBS

## 2019-01-08 DIAGNOSIS — I10 ESSENTIAL HYPERTENSION: ICD-10-CM

## 2019-01-08 DIAGNOSIS — I50.32 CHRONIC DIASTOLIC HEART FAILURE: ICD-10-CM

## 2019-01-08 DIAGNOSIS — Z95.2 S/P TAVR (TRANSCATHETER AORTIC VALVE REPLACEMENT): ICD-10-CM

## 2019-01-08 DIAGNOSIS — E03.9 HYPOTHYROIDISM, UNSPECIFIED TYPE: ICD-10-CM

## 2019-01-08 DIAGNOSIS — Z95.0 HX OF CARDIAC PACEMAKER: Chronic | ICD-10-CM

## 2019-01-08 DIAGNOSIS — I48.0 PAROXYSMAL ATRIAL FIBRILLATION: ICD-10-CM

## 2019-01-08 LAB
ASCENDING AORTA: 3.18 CM
AV INDEX (PROSTH): 0.56
AV MEAN GRADIENT: 9.38 MMHG
AV PEAK GRADIENT: 19.89 MMHG
AV VALVE AREA: 3.57 CM2
BSA FOR ECHO PROCEDURE: 1.8 M2
CV ECHO LV RWT: 0.43 CM
DOP CALC AO PEAK VEL: 2.23 M/S
DOP CALC AO VTI: 44.03 CM
DOP CALC LVOT AREA: 6.42 CM2
DOP CALC LVOT DIAMETER: 2.86 CM
DOP CALC LVOT STROKE VOLUME: 157.38 CM3
DOP CALCLVOT PEAK VEL VTI: 24.51 CM
E WAVE DECELERATION TIME: 191.95 MSEC
E/A RATIO: 1.51
E/E' RATIO: 28.86
ECHO LV POSTERIOR WALL: 1.04 CM (ref 0.6–1.1)
FRACTIONAL SHORTENING: 32 % (ref 28–44)
INTERVENTRICULAR SEPTUM: 0.8 CM (ref 0.6–1.1)
IVRT: 0.1 MSEC
LA MAJOR: 5.66 CM
LA MINOR: 5.62 CM
LA WIDTH: 5.19 CM
LEFT ATRIUM SIZE: 4.43 CM
LEFT ATRIUM VOLUME INDEX: 62.9 ML/M2
LEFT ATRIUM VOLUME: 110.22 CM3
LEFT INTERNAL DIMENSION IN SYSTOLE: 3.29 CM (ref 2.1–4)
LEFT VENTRICLE DIASTOLIC VOLUME INDEX: 63.36 ML/M2
LEFT VENTRICLE DIASTOLIC VOLUME: 111.03 ML
LEFT VENTRICLE MASS INDEX: 88.9 G/M2
LEFT VENTRICLE SYSTOLIC VOLUME INDEX: 25 ML/M2
LEFT VENTRICLE SYSTOLIC VOLUME: 43.73 ML
LEFT VENTRICULAR INTERNAL DIMENSION IN DIASTOLE: 4.87 CM (ref 3.5–6)
LEFT VENTRICULAR MASS: 155.86 G
LV LATERAL E/E' RATIO: 25.25
LV SEPTAL E/E' RATIO: 33.67
MV PEAK A VEL: 0.67 M/S
MV PEAK E VEL: 1.01 M/S
PISA TR MAX VEL: 2.76 M/S
PULM VEIN S/D RATIO: 0.65
PV PEAK D VEL: 0.48 M/S
PV PEAK S VEL: 0.31 M/S
RA MAJOR: 5.39 CM
RA PRESSURE: 3 MMHG
RA WIDTH: 4.37 CM
RIGHT VENTRICULAR END-DIASTOLIC DIMENSION: 4.26 CM
RV TISSUE DOPPLER FREE WALL SYSTOLIC VELOCITY 1 (APICAL 4 CHAMBER VIEW): 20.21 M/S
SINUS: 3.11 CM
STJ: 2.89 CM
TDI LATERAL: 0.04
TDI SEPTAL: 0.03
TDI: 0.04
TR MAX PG: 30.47 MMHG
TRICUSPID ANNULAR PLANE SYSTOLIC EXCURSION: 2.48 CM
TV REST PULMONARY ARTERY PRESSURE: 33 MMHG

## 2019-01-08 PROCEDURE — 99999 PR PBB SHADOW E&M-EST. PATIENT-LVL IV: CPT | Mod: PBBFAC,,,

## 2019-01-08 PROCEDURE — 93306 TTE W/DOPPLER COMPLETE: CPT | Mod: PBBFAC | Performed by: INTERNAL MEDICINE

## 2019-01-08 PROCEDURE — 99214 OFFICE O/P EST MOD 30 MIN: CPT | Mod: PBBFAC,25

## 2019-01-08 PROCEDURE — 99214 OFFICE O/P EST MOD 30 MIN: CPT | Mod: S$PBB,,, | Performed by: INTERNAL MEDICINE

## 2019-01-08 PROCEDURE — 99214 PR OFFICE/OUTPT VISIT, EST, LEVL IV, 30-39 MIN: ICD-10-PCS | Mod: S$PBB,,, | Performed by: INTERNAL MEDICINE

## 2019-01-08 PROCEDURE — 93306 TRANSTHORACIC ECHO (TTE) COMPLETE: ICD-10-PCS | Mod: 26,S$PBB,, | Performed by: INTERNAL MEDICINE

## 2019-01-08 PROCEDURE — 99999 PR PBB SHADOW E&M-EST. PATIENT-LVL IV: ICD-10-PCS | Mod: PBBFAC,,,

## 2019-01-08 NOTE — PROGRESS NOTES
"Subjective:    Patient ID:  Sangeetha Morales is a 89 y.o. female who presents for follow-up of TAVR.    Referring: Dr. Heller    HPI  Ms. Morales presents today for 1 year f/u s/p 29mm Evolut TAVR via R TF access on 11/28/17. She continues to do well, and she is without complaints today. She denies CP, SINHA, PND, orthopnea, or LE edema. He chief limitation is her chronic arthritis. She ambulates well with a walker and is able to completely take care of herself. She continues to follow with Dr. Heller and is scheduled to see him in March.     NYHA Class II, CCS Class 0    Review of Systems   Constitution: Negative for chills, diaphoresis, fever, weakness, weight gain and weight loss.   HENT: Negative for sore throat.    Eyes: Negative for blurred vision, vision loss in left eye, vision loss in right eye and visual disturbance.   Cardiovascular: Negative for chest pain, claudication, dyspnea on exertion, leg swelling, near-syncope, orthopnea, palpitations, paroxysmal nocturnal dyspnea and syncope.   Respiratory: Negative for cough, hemoptysis, shortness of breath, sputum production and wheezing.    Endocrine: Negative for cold intolerance and heat intolerance.   Hematologic/Lymphatic: Negative for adenopathy. Does not bruise/bleed easily.   Skin: Negative for rash.   Musculoskeletal: Negative for falls, muscle weakness and myalgias.   Gastrointestinal: Negative for abdominal pain, change in bowel habit, constipation, diarrhea, melena and nausea.   Genitourinary: Negative for bladder incontinence.   Neurological: Negative for dizziness, focal weakness, headaches, light-headedness and numbness.   Psychiatric/Behavioral: Negative for altered mental status.         Vitals:    01/08/19 1349 01/08/19 1351   BP: (!) 160/70 (!) 151/72   BP Location: Left arm Right arm   Patient Position: Sitting Sitting   BP Method: Large (Automatic) Large (Automatic)   Pulse: 61    SpO2: 97%    Weight: 69.3 kg (152 lb 12.5 oz)    Height: 5' 2" " (1.575 m)    Body mass index is 27.94 kg/m².    Objective:    Physical Exam   Constitutional: She is oriented to person, place, and time. She appears well-developed and well-nourished. No distress.   HENT:   Head: Normocephalic and atraumatic.   Mouth/Throat: Oropharynx is clear and moist.   Eyes: Conjunctivae and EOM are normal. Pupils are equal, round, and reactive to light. No scleral icterus.   Neck: Neck supple. No JVD present. No tracheal deviation present.   Cardiovascular: Normal rate and regular rhythm. Exam reveals no gallop and no friction rub.   Murmur heard.  Pulmonary/Chest: Effort normal and breath sounds normal. No respiratory distress. She has no wheezes. She has no rales. She exhibits no tenderness.   Abdominal: Soft. Bowel sounds are normal. She exhibits no distension. There is no hepatosplenomegaly. There is no tenderness.   Musculoskeletal: She exhibits no edema or tenderness.   Neurological: She is alert and oriented to person, place, and time.   Skin: Skin is warm and dry. No rash noted. No erythema.   Psychiatric: She has a normal mood and affect. Her behavior is normal.     Echo (1/8/19):  · Normal left ventricular systolic function. The estimated ejection fraction is 60%  · Mild left ventricular enlargement.  · Concentric left ventricular remodeling.  · Severe left atrial enlargement.  · Grade II (moderate) left ventricular diastolic dysfunction consistent with pseudonormalization.  · Elevated left atrial pressure.  · Normal right ventricular systolic function.  · There is a 29 mm Medtronic CoreValve transcutaneously-placed aortic bioprosthesis present. There is mild paravalvular aortic insufficiency present. JAZZMINE= 3.57 cm2, MG= 9.38 mmHg, PV= 2.23 m/s.   · The estimated PA systolic pressure is 33 mm Hg  · Normal central venous pressure (3 mm Hg).      Assessment:   S/P TAVR (transcatheter aortic valve replacement)  s/p 29mm Evolut TAVR via R TF access on 11/28/17.  Doing well.   On ASA  alone due to GIB.     Chronic diastolic heart failure  Well compensated clinically at this time.   Weights are stable.   On Lasix 20mg prn.     Paroxysmal atrial fibrillation  On Amiodarone, no AC (GIB when on eliquis)    Hx of cardiac pacemaker  Dual chamber PPM placed 11/30/16 for SSS.       Essential hypertension  Controlled on amlodipine, enalapril     Hypothyroidism  Stable, on synthroid     Plan:       Follow up with Dr. Heller as scheduled. F/U with us prn.   SBEP for life.   ASA indefinitely.

## 2019-03-23 PROBLEM — K92.2 GI BLEED: Status: ACTIVE | Noted: 2019-03-23

## 2019-11-29 PROBLEM — I50.33 ACUTE ON CHRONIC DIASTOLIC HEART FAILURE: Status: ACTIVE | Noted: 2017-11-29

## 2019-11-29 PROBLEM — I50.9 ACUTE ON CHRONIC CONGESTIVE HEART FAILURE: Status: ACTIVE | Noted: 2019-11-29

## 2020-07-04 PROBLEM — L03.90 CELLULITIS: Status: ACTIVE | Noted: 2020-07-04

## 2020-07-07 PROBLEM — E87.6 HYPOKALEMIA: Status: ACTIVE | Noted: 2020-07-07

## 2020-07-07 PROBLEM — R78.81 E COLI BACTEREMIA: Status: ACTIVE | Noted: 2020-07-07

## 2020-07-07 PROBLEM — B96.20 E COLI BACTEREMIA: Status: ACTIVE | Noted: 2020-07-07

## 2020-07-10 ENCOUNTER — DOCUMENT SCAN (OUTPATIENT)
Dept: HOME HEALTH SERVICES | Facility: HOSPITAL | Age: 85
End: 2020-07-10

## 2020-11-24 ENCOUNTER — DOCUMENT SCAN (OUTPATIENT)
Dept: HOME HEALTH SERVICES | Facility: HOSPITAL | Age: 85
End: 2020-11-24
